# Patient Record
Sex: FEMALE | Race: WHITE | NOT HISPANIC OR LATINO | Employment: FULL TIME | URBAN - METROPOLITAN AREA
[De-identification: names, ages, dates, MRNs, and addresses within clinical notes are randomized per-mention and may not be internally consistent; named-entity substitution may affect disease eponyms.]

---

## 2023-11-18 ENCOUNTER — LAB (OUTPATIENT)
Dept: LAB | Facility: LAB | Age: 63
End: 2023-11-18
Payer: COMMERCIAL

## 2023-11-18 DIAGNOSIS — L40.3 PUSTULOSIS PALMARIS ET PLANTARIS: ICD-10-CM

## 2023-11-18 DIAGNOSIS — L29.8 OTHER PRURITUS: ICD-10-CM

## 2023-11-18 DIAGNOSIS — Z79.899 OTHER LONG TERM (CURRENT) DRUG THERAPY: Primary | ICD-10-CM

## 2023-11-18 DIAGNOSIS — T07.XXXA UNSPECIFIED MULTIPLE INJURIES, INITIAL ENCOUNTER: ICD-10-CM

## 2023-11-18 LAB
ALBUMIN SERPL BCP-MCNC: 4.1 G/DL (ref 3.4–5)
ALP SERPL-CCNC: 50 U/L (ref 33–136)
ALT SERPL W P-5'-P-CCNC: 26 U/L (ref 7–45)
ANION GAP SERPL CALC-SCNC: 13 MMOL/L (ref 10–20)
AST SERPL W P-5'-P-CCNC: 21 U/L (ref 9–39)
BASOPHILS # BLD AUTO: 0.04 X10*3/UL (ref 0–0.1)
BASOPHILS NFR BLD AUTO: 0.9 %
BILIRUB SERPL-MCNC: 0.4 MG/DL (ref 0–1.2)
BUN SERPL-MCNC: 21 MG/DL (ref 6–23)
CALCIUM SERPL-MCNC: 9 MG/DL (ref 8.6–10.3)
CHLORIDE SERPL-SCNC: 108 MMOL/L (ref 98–107)
CO2 SERPL-SCNC: 27 MMOL/L (ref 21–32)
CREAT SERPL-MCNC: 0.74 MG/DL (ref 0.5–1.05)
EOSINOPHIL # BLD AUTO: 0.26 X10*3/UL (ref 0–0.7)
EOSINOPHIL NFR BLD AUTO: 6 %
ERYTHROCYTE [DISTWIDTH] IN BLOOD BY AUTOMATED COUNT: 15.1 % (ref 11.5–14.5)
GFR SERPL CREATININE-BSD FRML MDRD: >90 ML/MIN/1.73M*2
GLUCOSE SERPL-MCNC: 86 MG/DL (ref 74–99)
HBV CORE AB SER QL: NONREACTIVE
HBV SURFACE AB SER-ACNC: <3.1 MIU/ML
HBV SURFACE AG SERPL QL IA: NONREACTIVE
HCT VFR BLD AUTO: 44 % (ref 36–46)
HCV AB SER QL: NONREACTIVE
HGB BLD-MCNC: 14.4 G/DL (ref 12–16)
HIV 1+2 AB+HIV1 P24 AG SERPL QL IA: NONREACTIVE
IMM GRANULOCYTES # BLD AUTO: 0.01 X10*3/UL (ref 0–0.7)
IMM GRANULOCYTES NFR BLD AUTO: 0.2 % (ref 0–0.9)
LYMPHOCYTES # BLD AUTO: 1.84 X10*3/UL (ref 1.2–4.8)
LYMPHOCYTES NFR BLD AUTO: 42.5 %
MCH RBC QN AUTO: 33.8 PG (ref 26–34)
MCHC RBC AUTO-ENTMCNC: 32.7 G/DL (ref 32–36)
MCV RBC AUTO: 103 FL (ref 80–100)
MONOCYTES # BLD AUTO: 0.37 X10*3/UL (ref 0.1–1)
MONOCYTES NFR BLD AUTO: 8.5 %
NEUTROPHILS # BLD AUTO: 1.81 X10*3/UL (ref 1.2–7.7)
NEUTROPHILS NFR BLD AUTO: 41.9 %
NRBC BLD-RTO: 0 /100 WBCS (ref 0–0)
PLATELET # BLD AUTO: 250 X10*3/UL (ref 150–450)
POTASSIUM SERPL-SCNC: 4.4 MMOL/L (ref 3.5–5.3)
PROT SERPL-MCNC: 6 G/DL (ref 6.4–8.2)
RBC # BLD AUTO: 4.26 X10*6/UL (ref 4–5.2)
SODIUM SERPL-SCNC: 144 MMOL/L (ref 136–145)
WBC # BLD AUTO: 4.3 X10*3/UL (ref 4.4–11.3)

## 2023-11-18 PROCEDURE — 87340 HEPATITIS B SURFACE AG IA: CPT

## 2023-11-18 PROCEDURE — 85025 COMPLETE CBC W/AUTO DIFF WBC: CPT

## 2023-11-18 PROCEDURE — 86803 HEPATITIS C AB TEST: CPT

## 2023-11-18 PROCEDURE — 80053 COMPREHEN METABOLIC PANEL: CPT

## 2023-11-18 PROCEDURE — 86708 HEPATITIS A ANTIBODY: CPT

## 2023-11-18 PROCEDURE — 36415 COLL VENOUS BLD VENIPUNCTURE: CPT

## 2023-11-18 PROCEDURE — 87389 HIV-1 AG W/HIV-1&-2 AB AG IA: CPT

## 2023-11-18 PROCEDURE — 86706 HEP B SURFACE ANTIBODY: CPT

## 2023-11-18 PROCEDURE — 86704 HEP B CORE ANTIBODY TOTAL: CPT

## 2023-11-19 LAB — HAV AB SER QL IA: NONREACTIVE

## 2023-11-30 ENCOUNTER — LAB (OUTPATIENT)
Dept: LAB | Facility: LAB | Age: 63
End: 2023-11-30
Payer: COMMERCIAL

## 2023-11-30 DIAGNOSIS — Z79.899 OTHER LONG TERM (CURRENT) DRUG THERAPY: Primary | ICD-10-CM

## 2023-11-30 PROCEDURE — 36415 COLL VENOUS BLD VENIPUNCTURE: CPT

## 2023-11-30 PROCEDURE — 86481 TB AG RESPONSE T-CELL SUSP: CPT

## 2023-12-02 LAB
NIL(NEG) CONTROL SPOT COUNT: NORMAL
PANEL A SPOT COUNT: 0
PANEL B SPOT COUNT: 0
POS CONTROL SPOT COUNT: NORMAL
T-SPOT. TB INTERPRETATION: NEGATIVE

## 2024-02-09 PROBLEM — L40.9 PSORIASIS: Status: ACTIVE | Noted: 2019-03-09

## 2024-02-09 PROBLEM — R07.81 RIB PAIN: Status: RESOLVED | Noted: 2018-05-10 | Resolved: 2024-02-09

## 2024-02-09 PROBLEM — Z86.010 HISTORY OF COLONIC POLYPS: Status: RESOLVED | Noted: 2024-02-09 | Resolved: 2024-02-09

## 2024-02-09 PROBLEM — M79.643 HAND PAIN: Status: RESOLVED | Noted: 2024-02-09 | Resolved: 2024-02-09

## 2024-02-09 PROBLEM — R05.9 COUGH: Status: RESOLVED | Noted: 2018-05-10 | Resolved: 2024-02-09

## 2024-02-09 PROBLEM — D12.5 BENIGN NEOPLASM OF SIGMOID COLON: Status: ACTIVE | Noted: 2024-02-09

## 2024-02-09 PROBLEM — F17.210 CIGARETTE SMOKER: Status: ACTIVE | Noted: 2023-02-21

## 2024-02-09 PROBLEM — S61.219A LACERATION OF FINGER: Status: RESOLVED | Noted: 2024-02-09 | Resolved: 2024-02-09

## 2024-02-09 PROBLEM — Z86.0100 HISTORY OF COLONIC POLYPS: Status: RESOLVED | Noted: 2024-02-09 | Resolved: 2024-02-09

## 2024-02-09 PROBLEM — E78.00 ELEVATED CHOLESTEROL: Status: RESOLVED | Noted: 2021-06-16 | Resolved: 2024-02-09

## 2024-02-09 RX ORDER — BACLOFEN 10 MG/1
10 TABLET ORAL EVERY 8 HOURS PRN
COMMUNITY
Start: 2023-01-17 | End: 2024-02-14 | Stop reason: WASHOUT

## 2024-02-09 RX ORDER — LEFLUNOMIDE 10 MG/1
10 TABLET ORAL DAILY
COMMUNITY
Start: 2023-10-11 | End: 2024-03-22 | Stop reason: SDUPTHER

## 2024-02-09 RX ORDER — BENZONATATE 100 MG/1
CAPSULE ORAL
COMMUNITY
Start: 2023-06-26 | End: 2024-02-14 | Stop reason: WASHOUT

## 2024-02-09 RX ORDER — FOLIC ACID 1 MG/1
1 TABLET ORAL DAILY
COMMUNITY
End: 2024-02-14 | Stop reason: WASHOUT

## 2024-02-09 RX ORDER — DICLOFENAC SODIUM 50 MG/1
50 TABLET, DELAYED RELEASE ORAL 2 TIMES DAILY
COMMUNITY
Start: 2023-11-16 | End: 2024-02-14 | Stop reason: WASHOUT

## 2024-02-09 RX ORDER — APREMILAST 30 MG/1
TABLET, FILM COATED ORAL EVERY 12 HOURS
COMMUNITY
End: 2024-02-14 | Stop reason: WASHOUT

## 2024-02-09 RX ORDER — CLOBETASOL PROPIONATE 0.5 MG/G
CREAM TOPICAL
COMMUNITY
Start: 2023-11-09 | End: 2024-02-14 | Stop reason: WASHOUT

## 2024-02-09 RX ORDER — HYDROCORTISONE 25 MG/G
OINTMENT TOPICAL AS NEEDED
COMMUNITY
Start: 2023-11-09

## 2024-02-09 RX ORDER — RISANKIZUMAB-RZAA 75 MG/0.83
KIT SUBCUTANEOUS
COMMUNITY
End: 2024-02-14 | Stop reason: WASHOUT

## 2024-02-09 RX ORDER — GUSELKUMAB 100 MG/ML
INJECTION SUBCUTANEOUS
COMMUNITY
Start: 2021-05-19 | End: 2024-02-14 | Stop reason: WASHOUT

## 2024-02-09 RX ORDER — IXEKIZUMAB 80 MG/ML
INJECTION, SOLUTION SUBCUTANEOUS
COMMUNITY
Start: 2023-10-23

## 2024-02-09 RX ORDER — ALBUTEROL SULFATE 90 UG/1
AEROSOL, METERED RESPIRATORY (INHALATION)
COMMUNITY
Start: 2023-06-26 | End: 2024-02-14 | Stop reason: WASHOUT

## 2024-02-09 RX ORDER — IBANDRONATE SODIUM 150 MG/1
TABLET, FILM COATED ORAL
COMMUNITY
Start: 2023-09-21 | End: 2024-02-14 | Stop reason: WASHOUT

## 2024-02-09 RX ORDER — ERGOCALCIFEROL 1.25 MG/1
50000 CAPSULE ORAL WEEKLY
COMMUNITY
Start: 2023-10-12 | End: 2024-02-14 | Stop reason: SDUPTHER

## 2024-02-09 RX ORDER — METHOTREXATE 2.5 MG/1
TABLET ORAL
COMMUNITY
End: 2024-02-14 | Stop reason: WASHOUT

## 2024-02-14 ENCOUNTER — OFFICE VISIT (OUTPATIENT)
Dept: PRIMARY CARE | Facility: CLINIC | Age: 64
End: 2024-02-14
Payer: COMMERCIAL

## 2024-02-14 VITALS
BODY MASS INDEX: 25.76 KG/M2 | SYSTOLIC BLOOD PRESSURE: 128 MMHG | DIASTOLIC BLOOD PRESSURE: 82 MMHG | HEIGHT: 62 IN | TEMPERATURE: 97.7 F | WEIGHT: 140 LBS | OXYGEN SATURATION: 97 % | HEART RATE: 94 BPM

## 2024-02-14 DIAGNOSIS — Z01.89 ENCOUNTER FOR ROUTINE LABORATORY TESTING: ICD-10-CM

## 2024-02-14 DIAGNOSIS — L40.9 PSORIASIS: ICD-10-CM

## 2024-02-14 DIAGNOSIS — M81.0 AGE-RELATED OSTEOPOROSIS WITHOUT CURRENT PATHOLOGICAL FRACTURE: ICD-10-CM

## 2024-02-14 DIAGNOSIS — M19.90 INFLAMMATORY ARTHRITIS: Primary | ICD-10-CM

## 2024-02-14 DIAGNOSIS — E55.9 VITAMIN D DEFICIENCY: ICD-10-CM

## 2024-02-14 PROCEDURE — 99203 OFFICE O/P NEW LOW 30 MIN: CPT | Performed by: INTERNAL MEDICINE

## 2024-02-14 PROCEDURE — 4004F PT TOBACCO SCREEN RCVD TLK: CPT | Performed by: INTERNAL MEDICINE

## 2024-02-14 RX ORDER — IBANDRONATE SODIUM 150 MG/1
150 TABLET, FILM COATED ORAL
COMMUNITY
End: 2024-02-14 | Stop reason: SDUPTHER

## 2024-02-14 RX ORDER — IBANDRONATE SODIUM 150 MG/1
150 TABLET, FILM COATED ORAL
Qty: 3 TABLET | Refills: 1 | Status: SHIPPED | OUTPATIENT
Start: 2024-02-14

## 2024-02-14 RX ORDER — ERGOCALCIFEROL 1.25 MG/1
50000 CAPSULE ORAL
Qty: 12 CAPSULE | Refills: 1 | Status: SHIPPED | OUTPATIENT
Start: 2024-02-14 | End: 2024-05-14 | Stop reason: ALTCHOICE

## 2024-02-14 ASSESSMENT — ENCOUNTER SYMPTOMS
DEPRESSION: 0
LOSS OF SENSATION IN FEET: 0
OCCASIONAL FEELINGS OF UNSTEADINESS: 0

## 2024-02-14 ASSESSMENT — PATIENT HEALTH QUESTIONNAIRE - PHQ9
SUM OF ALL RESPONSES TO PHQ9 QUESTIONS 1 AND 2: 0
1. LITTLE INTEREST OR PLEASURE IN DOING THINGS: NOT AT ALL
2. FEELING DOWN, DEPRESSED OR HOPELESS: NOT AT ALL

## 2024-02-14 ASSESSMENT — PAIN SCALES - GENERAL: PAINLEVEL: 4

## 2024-02-14 NOTE — PROGRESS NOTES
[facility]: [facility]  PROGRESS NOTE      Ammy Mar is a 63 y.o. female that is presenting today for Establish Care.    Assessment/Plan   Diagnoses and all orders for this visit:    Reviewed available records including Dr Oconnell records  Inflammatory arthritis    Fair control / Followed by Rheumatology  Psoriasis    On Arava / Followed by Rheumatology   Age-related osteoporosis without current pathological fracture     Under control with current treatment   Continue the same   Rx E-scripted 90 days x 1  -     ibandronate (Boniva) 150 mg tablet; Take 1 tablet (150 mg) by mouth every 30 (thirty) days. Take in morning with full glass of water on an empty stomach. No food, drink, meds, or lying down for 60 minutes after.  Vitamin D deficiency     Under control with current treatment   Continue the same   Rx E-scripted 90 days x 1  -     ergocalciferol (Vitamin D-2) 1.25 MG (09448 UT) capsule; Take 1 capsule (50,000 Units) by mouth 1 (one) time per week.  -     Vitamin D 25-Hydroxy,Total (for eval of Vitamin D levels); Future  Encounter for routine laboratory testing  -     Comprehensive Metabolic Panel; Future  -     CBC and Auto Differential; Future  -     Lipid Panel; Future  -     Hemoglobin A1C; Future  -     Vitamin D 25-Hydroxy,Total (for eval of Vitamin D levels); Future  -     TSH with reflex to Free T4 if abnormal; Future  Other orders  -     Follow Up In Primary Care; Future  Subjective   - Patient is here today to establish care been doing fairly well.    - Patient denies any other symptoms or concerns at this time.    - patient denies any adverse reactions to or concerns with his/her meds.      Review of Systems   All pertinent POSITIVES as noted per HPI.  All other systems have been reviewed and are NEGATIVE and /or Noncontributory to this patient current visit or complaint.    Objective   Vitals:    02/14/24 1617   BP: 128/82   Pulse: 94   Temp: 36.5 °C (97.7 °F)   SpO2: 97%  "     Body mass index is 25.61 kg/m².  Physical Exam  Vitals and nursing note reviewed.   Constitutional:       Appearance: Normal appearance.   HENT:      Head: Normocephalic and atraumatic.   Neck:      Vascular: No carotid bruit.   Cardiovascular:      Rate and Rhythm: Normal rate and regular rhythm.      Pulses: Normal pulses.      Heart sounds: Normal heart sounds.   Pulmonary:      Effort: Pulmonary effort is normal.      Breath sounds: Normal breath sounds.   Abdominal:      General: Abdomen is flat. Bowel sounds are normal.      Palpations: Abdomen is soft.   Musculoskeletal:         General: No swelling. Normal range of motion.      Cervical back: Neck supple.   Lymphadenopathy:      Cervical: No cervical adenopathy.   Skin:     General: Skin is warm and dry.   Neurological:      Mental Status: She is alert.   Psychiatric:         Mood and Affect: Mood normal.     Diagnostic Results   Lab Results   Component Value Date    GLUCOSE 86 11/18/2023    CALCIUM 9.0 11/18/2023     11/18/2023    K 4.4 11/18/2023    CO2 27 11/18/2023     (H) 11/18/2023    BUN 21 11/18/2023    CREATININE 0.74 11/18/2023     Lab Results   Component Value Date    ALT 26 11/18/2023    AST 21 11/18/2023    ALKPHOS 50 11/18/2023    BILITOT 0.4 11/18/2023     Lab Results   Component Value Date    WBC 4.3 (L) 11/18/2023    HGB 14.4 11/18/2023    HCT 44.0 11/18/2023     (H) 11/18/2023     11/18/2023     Lab Results   Component Value Date    CHOL 183 01/18/2023    CHOL 206 (H) 06/25/2022    CHOL 223 (H) 06/08/2021     Lab Results   Component Value Date    HDL 59 01/18/2023    HDL 64 06/25/2022    HDL 73 06/08/2021     Lab Results   Component Value Date    LDLCALC 112 01/18/2023    LDLCALC 127 06/25/2022    LDLCALC 138 (H) 06/08/2021     Lab Results   Component Value Date    TRIG 59 01/18/2023    TRIG 73 06/25/2022    TRIG 59 06/08/2021     No components found for: \"CHOLHDL\"  Lab Results   Component Value Date    HGBA1C " 5.5 01/21/2023     Other labs not included in the list above were reviewed either before or during this encounter.    History    Past Medical History:   Diagnosis Date    Cough 05/10/2018    Elevated cholesterol 06/16/2021    Hand pain 02/09/2024    Laceration of finger 02/09/2024    RA (rheumatoid arthritis) (CMS/[facility])      Past Surgical History:   Procedure Laterality Date    BACK SURGERY      CARPAL TUNNEL RELEASE Right     HYSTERECTOMY       Family History   Problem Relation Name Age of Onset    Diabetes Mother      Heart disease Mother      Hypertension Mother      Bone cancer Father      Other (multiple health issues) Sister      Prostate cancer Brother       Social History     Socioeconomic History    Marital status:      Spouse name: Not on file    Number of children: Not on file    Years of education: Not on file    Highest education level: Not on file   Occupational History    Not on file   Tobacco Use    Smoking status: Every Day     Packs/day: 0.25     Years: 33.00     Additional pack years: 0.00     Total pack years: 8.25     Types: Cigarettes    Smokeless tobacco: Never   Vaping Use    Vaping Use: Never used   Substance and Sexual Activity    Alcohol use: Yes     Comment: rarely    Drug use: Never    Sexual activity: Not on file   Other Topics Concern    Not on file   Social History Narrative    Not on file     Social Determinants of Health     Financial Resource Strain: Not on file   Food Insecurity: Not on file   Transportation Needs: Not on file   Physical Activity: Not on file   Stress: Not on file   Social Connections: Not on file   Intimate Partner Violence: Not on file   Housing Stability: Not on file     Allergies   Allergen Reactions    Ciprofloxacin Hives and Unknown    Erythromycin Nausea/vomiting and Unknown    Penicillins Hives and Unknown    Sulfa (Sulfonamide Antibiotics) GI Upset, Other and Nausea And Vomiting     Current Outpatient Medications on File Prior to Visit   Medication  Sig Dispense Refill    BIOTIN ORAL Take by mouth.      ergocalciferol (Vitamin D-2) 1.25 MG (25942 UT) capsule Take 1 capsule (50,000 Units) by mouth once a week.      hydrocortisone 2.5 % ointment if needed for rash.      ibandronate (Boniva) 150 mg tablet Take 1 tablet (150 mg) by mouth every 30 (thirty) days. Take in morning with full glass of water on an empty stomach. No food, drink, meds, or lying down for 60 minutes after.      leflunomide (Arava) 10 mg tablet Take 1 tablet (10 mg) by mouth once daily.      Taltz Autoinjector 80 mg/mL injection Inject under the skin every 28 (twenty-eight) days.      [DISCONTINUED] albuterol 90 mcg/actuation inhaler INHALE 1 TO 2 PUFFS EVERY 4 TO 6 HOURS AS NEEDED FOR WHEEZE FOR UP TO 30 DAYS      [DISCONTINUED] apremilast (Otezla) 30 mg tablet every 12 hours.      [DISCONTINUED] baclofen (Lioresal) 10 mg tablet Take 1 tablet (10 mg) by mouth every 8 hours if needed.      [DISCONTINUED] benzonatate (Tessalon) 100 mg capsule SWALLOW WHOLE TAKE 1 CAPSULE 3 TIMES A DAY AS NEEDED *DO NOT BREAK CHEW, DISSOLVE, CUT, OR CRUSH*      [DISCONTINUED] clobetasol (Temovate) 0.05 % cream       [DISCONTINUED] diclofenac (Voltaren) 50 mg EC tablet Take 1 tablet (50 mg) by mouth 2 times a day.      [DISCONTINUED] flurandrenolide 4 mcg/cm2 tape Apply topically.      [DISCONTINUED] folic acid (Folvite) 1 mg tablet Take 1 tablet (1 mg) by mouth once daily.      [DISCONTINUED] ibandronate (Boniva) 150 mg tablet PLEASE SEE ATTACHED FOR DETAILED DIRECTIONS      [DISCONTINUED] methotrexate (Trexall) 2.5 mg tablet TAKE 10 TABLETS BY MOUTH EVERY MONDAY      [DISCONTINUED] risankizumab 150 Dose (Skyrizi) 150mg/1.66mL(75 mg/0.83 mL x2) prefilled syringe kit 1.66 ml Subcutaneous for 30 day(s)      [DISCONTINUED] Tremfya 100 mg/mL injection        No current facility-administered medications on file prior to visit.     Immunization History   Administered Date(s) Administered    Moderna SARS-CoV-2  Vaccination 03/24/2021, 04/23/2021    Pfizer COVID-19 vaccine, bivalent, age 12 years and older (30 mcg/0.3 mL) 09/27/2022    Pfizer Gray Cap SARS-CoV-2 05/06/2022    Pfizer Purple Cap SARS-CoV-2 11/19/2021    Tdap vaccine, age 7 year and older (BOOSTRIX, ADACEL) 01/12/2020, 03/23/2020     Patient's medical history was reviewed and updated either before or during this encounter.       Omar Guerin MD

## 2024-02-15 PROBLEM — M19.90 INFLAMMATORY ARTHRITIS: Status: ACTIVE | Noted: 2024-02-15

## 2024-02-15 PROBLEM — M05.79 RHEUMATOID ARTHRITIS INVOLVING MULTIPLE SITES WITH POSITIVE RHEUMATOID FACTOR (MULTI): Status: RESOLVED | Noted: 2024-02-15 | Resolved: 2024-02-15

## 2024-02-15 PROBLEM — M81.0 AGE-RELATED OSTEOPOROSIS WITHOUT CURRENT PATHOLOGICAL FRACTURE: Status: ACTIVE | Noted: 2024-02-15

## 2024-02-15 PROBLEM — M05.79 RHEUMATOID ARTHRITIS INVOLVING MULTIPLE SITES WITH POSITIVE RHEUMATOID FACTOR (MULTI): Status: ACTIVE | Noted: 2024-02-15

## 2024-02-15 PROBLEM — E55.9 VITAMIN D DEFICIENCY: Status: ACTIVE | Noted: 2024-02-15

## 2024-03-22 ENCOUNTER — OFFICE VISIT (OUTPATIENT)
Dept: RHEUMATOLOGY | Facility: CLINIC | Age: 64
End: 2024-03-22
Payer: COMMERCIAL

## 2024-03-22 ENCOUNTER — LAB (OUTPATIENT)
Dept: LAB | Facility: LAB | Age: 64
End: 2024-03-22
Payer: COMMERCIAL

## 2024-03-22 VITALS — BODY MASS INDEX: 24.87 KG/M2 | WEIGHT: 136 LBS

## 2024-03-22 DIAGNOSIS — M06.9 RHEUMATOID ARTHRITIS, INVOLVING UNSPECIFIED SITE, UNSPECIFIED WHETHER RHEUMATOID FACTOR PRESENT (MULTI): Primary | ICD-10-CM

## 2024-03-22 DIAGNOSIS — L40.9 PSORIASIS: ICD-10-CM

## 2024-03-22 DIAGNOSIS — M06.9 RHEUMATOID ARTHRITIS, INVOLVING UNSPECIFIED SITE, UNSPECIFIED WHETHER RHEUMATOID FACTOR PRESENT (MULTI): ICD-10-CM

## 2024-03-22 PROCEDURE — 80076 HEPATIC FUNCTION PANEL: CPT

## 2024-03-22 PROCEDURE — 99204 OFFICE O/P NEW MOD 45 MIN: CPT | Performed by: INTERNAL MEDICINE

## 2024-03-22 PROCEDURE — 86140 C-REACTIVE PROTEIN: CPT

## 2024-03-22 PROCEDURE — 82565 ASSAY OF CREATININE: CPT

## 2024-03-22 PROCEDURE — 36415 COLL VENOUS BLD VENIPUNCTURE: CPT

## 2024-03-22 PROCEDURE — 99214 OFFICE O/P EST MOD 30 MIN: CPT | Performed by: INTERNAL MEDICINE

## 2024-03-22 PROCEDURE — 85025 COMPLETE CBC W/AUTO DIFF WBC: CPT

## 2024-03-22 PROCEDURE — 4004F PT TOBACCO SCREEN RCVD TLK: CPT | Performed by: INTERNAL MEDICINE

## 2024-03-22 RX ORDER — NABUMETONE 750 MG/1
750 TABLET, FILM COATED ORAL 2 TIMES DAILY PRN
Qty: 60 TABLET | Refills: 3 | Status: SHIPPED | OUTPATIENT
Start: 2024-03-22 | End: 2024-11-17

## 2024-03-22 RX ORDER — LEFLUNOMIDE 20 MG/1
20 TABLET ORAL DAILY
Qty: 30 TABLET | Refills: 3 | Status: SHIPPED | OUTPATIENT
Start: 2024-03-22

## 2024-03-22 NOTE — PROGRESS NOTES
"Ref per Dr. Oconnor for evaluation and treatment of  PS  /  PSA  /  ? RA   Previous [facility] patient.  Treatment with Leflunomide and Taltz ( Rx with Dr. Mejia )    Previous Otezla, tremphya, skyrizi, methotrexate with little relief.  Per patient, increased pain since coming off of diclofinac bid and methotrexate # 9 tabs weekly due to hair loss.      HPI - Former [facility] pt - she has a h/o Ps with \"thousands of blisters on my foot - it was always my R foot and my R hand.  She was on otezla, stopped working, then skyrizi which helped for awhile, then stopped, then tremfya.  She is now on taltz, and this works the best.   She has arthritis in her back - she has had 3 back surgeries.  She woke up 1 morning (?1 yr ago) and couldn't \"get off the couch myself - every bone in my body hurt so bad.  I would have to scream out, and my  would have to get me off the couch\"  This started \"right before the 1st set of notes\"   Her primary put her on steroids, which helped somewhat.  She saw rheum who put her on methotrexate 10 tab, leflunomide, diclofenac, and folic acid, and this made her hair thin.  She was decr to 9 mg, and then it was stopped 2/5/24.  She is still on lelfunomide.  She was also taken off diclofenac.  Hair loss has stopped.   \"I, un, you know, uh, sometimes I ache\"  - she gets hand and shoulder pain and sometimes her knees.  When it 1st started, she couldn't even lift her arms d/t pain.  AM stiffness until a hot shower.  She said that \"they said the R ankle seems a bit swollen\"  \"my upper arms, my joints - especially with the weather changes\"  Things did feel better when she was on methotrexate and diclofenac.   She is on ibandronate from rheum initially, now primary filling it.  She has no h/o fx.  She was on pred \"awhile, months\"   No parental hip fx.   No fever, HA (did have with otezla), mouth sores, or tongue/jaw irene.  ?dry eyes \"I always had I don't tear enough - I can't wear contacts\"  Sometimes " "has \"a spot\" or a flashing light - not often - saw a specialist who didn't see anything.  No raynauds, CP, or resp.  \"I have constant gas\" and constipation.   No numbness/tingling    FH - doesn't know much FH  SH - +smoker.   No EtOH.  No drugs    PE  Gen - NAD  Neck - supple, no LAD  CV - RRR no r/m/g  Lungs - CTA  Abd - +BS  Extr - 2+ DP, PT, and rad pulses.  No edema  Skin - no rash.  Toenails polished  Psych - nl affect  Neuro - nl strength.  Difficulty eliciting BLE DTRs  Msk - no synovitis, enthesitis, or dactylitis.  R wrist tender and pain with ROM.  Pain with ROM B shoulders.    A/P - Pt with palmoplantar pustular psoriasis, chronic back pain s/p 3 back surgeries who presented to primary with sudden onset of pain all over that responded to prednisone.  She saw rheum who started her on leflunomide, methotrexate, and diclofenac and tapered her off prednisone.  She developed hair loss, and methotrexate and diclofenac were stopped few mo ago.  Hair loss has stopped although hair remains thin.  Exam remarkable for pain with ROM R wrist and B shoulders but no obvious inflammation  RF neg, CCP Ab \"mod\"-\"high\" +  ALLYN+x1 with +anshul-1Ab and nl CK, ALLYN neg x several since  ?diagnosis - poss RA with Ps, PsA, etc, although sx init sounded possibly like PMR.  Taltz keeping Ps under control  OP - primary treating  Smoking cessation urged  Nabumetone - expl risks/benefits/no OTC NSAIDs  Increase lef to 20mg daily  Check labs  Reeval 1 mo  "

## 2024-03-23 LAB
ALBUMIN SERPL BCP-MCNC: 4.6 G/DL (ref 3.4–5)
ALP SERPL-CCNC: 56 U/L (ref 33–136)
ALT SERPL W P-5'-P-CCNC: 14 U/L (ref 7–45)
AST SERPL W P-5'-P-CCNC: 19 U/L (ref 9–39)
BASOPHILS # BLD AUTO: 0.07 X10*3/UL (ref 0–0.1)
BASOPHILS NFR BLD AUTO: 1.2 %
BILIRUB DIRECT SERPL-MCNC: 0.1 MG/DL (ref 0–0.3)
BILIRUB SERPL-MCNC: 0.5 MG/DL (ref 0–1.2)
CREAT SERPL-MCNC: 0.59 MG/DL (ref 0.5–1.05)
CRP SERPL-MCNC: 0.14 MG/DL
EGFRCR SERPLBLD CKD-EPI 2021: >90 ML/MIN/1.73M*2
EOSINOPHIL # BLD AUTO: 0.27 X10*3/UL (ref 0–0.7)
EOSINOPHIL NFR BLD AUTO: 4.6 %
ERYTHROCYTE [DISTWIDTH] IN BLOOD BY AUTOMATED COUNT: 13.2 % (ref 11.5–14.5)
HCT VFR BLD AUTO: 47 % (ref 36–46)
HGB BLD-MCNC: 15.5 G/DL (ref 12–16)
IMM GRANULOCYTES # BLD AUTO: 0.01 X10*3/UL (ref 0–0.7)
IMM GRANULOCYTES NFR BLD AUTO: 0.2 % (ref 0–0.9)
LYMPHOCYTES # BLD AUTO: 2.92 X10*3/UL (ref 1.2–4.8)
LYMPHOCYTES NFR BLD AUTO: 50 %
MCH RBC QN AUTO: 32.8 PG (ref 26–34)
MCHC RBC AUTO-ENTMCNC: 33 G/DL (ref 32–36)
MCV RBC AUTO: 99 FL (ref 80–100)
MONOCYTES # BLD AUTO: 0.46 X10*3/UL (ref 0.1–1)
MONOCYTES NFR BLD AUTO: 7.9 %
NEUTROPHILS # BLD AUTO: 2.11 X10*3/UL (ref 1.2–7.7)
NEUTROPHILS NFR BLD AUTO: 36.1 %
NRBC BLD-RTO: 0 /100 WBCS (ref 0–0)
PLATELET # BLD AUTO: 240 X10*3/UL (ref 150–450)
PROT SERPL-MCNC: 7 G/DL (ref 6.4–8.2)
RBC # BLD AUTO: 4.73 X10*6/UL (ref 4–5.2)
WBC # BLD AUTO: 5.8 X10*3/UL (ref 4.4–11.3)

## 2024-04-22 ENCOUNTER — OFFICE VISIT (OUTPATIENT)
Dept: RHEUMATOLOGY | Facility: CLINIC | Age: 64
End: 2024-04-22
Payer: COMMERCIAL

## 2024-04-22 ENCOUNTER — LAB (OUTPATIENT)
Dept: LAB | Facility: LAB | Age: 64
End: 2024-04-22
Payer: COMMERCIAL

## 2024-04-22 VITALS — WEIGHT: 128.9 LBS | DIASTOLIC BLOOD PRESSURE: 78 MMHG | BODY MASS INDEX: 23.58 KG/M2 | SYSTOLIC BLOOD PRESSURE: 128 MMHG

## 2024-04-22 DIAGNOSIS — L40.9 PSORIASIS: Primary | ICD-10-CM

## 2024-04-22 DIAGNOSIS — L40.9 PSORIASIS: ICD-10-CM

## 2024-04-22 DIAGNOSIS — M19.90 INFLAMMATORY ARTHRITIS: ICD-10-CM

## 2024-04-22 PROCEDURE — 99214 OFFICE O/P EST MOD 30 MIN: CPT | Performed by: INTERNAL MEDICINE

## 2024-04-22 PROCEDURE — 80076 HEPATIC FUNCTION PANEL: CPT

## 2024-04-22 PROCEDURE — 86140 C-REACTIVE PROTEIN: CPT

## 2024-04-22 PROCEDURE — 82565 ASSAY OF CREATININE: CPT

## 2024-04-22 PROCEDURE — 36415 COLL VENOUS BLD VENIPUNCTURE: CPT

## 2024-04-22 PROCEDURE — 85025 COMPLETE CBC W/AUTO DIFF WBC: CPT

## 2024-04-22 NOTE — PROGRESS NOTES
"HPI \"I feel a bit better\" but does have \"moments\" especially in AM.  She takes nabumetone PRN but hasn't needed it much.  Her hands, arms, and knees hurt but not as much.  No swelling.   AM stiffness ?duration \"I get up an hour early, and then I get a hot shower, which usually helps\"  Ps is good.  She had 2 blisters on hand and 1 on foot, now resolved.  No CP, resp other than sneezing with allergies, or GI    PE  NAD - smells of cigarettes  RRR no r/m/g  CTA  No edema  No synovitis    A/P - Pt with palmoplantar Ps, chronic back pain s/p surg x3, and PsA - feeling better with taltz and incr leflunomide  OP - primary treats  Reviewed prev labs  Check labs  Reeval 8 wks  "

## 2024-04-23 LAB
ALBUMIN SERPL BCP-MCNC: 4.7 G/DL (ref 3.4–5)
ALP SERPL-CCNC: 46 U/L (ref 33–136)
ALT SERPL W P-5'-P-CCNC: 13 U/L (ref 7–45)
AST SERPL W P-5'-P-CCNC: 15 U/L (ref 9–39)
BASOPHILS # BLD AUTO: 0.06 X10*3/UL (ref 0–0.1)
BASOPHILS NFR BLD AUTO: 0.9 %
BILIRUB DIRECT SERPL-MCNC: 0.1 MG/DL (ref 0–0.3)
BILIRUB SERPL-MCNC: 0.5 MG/DL (ref 0–1.2)
CREAT SERPL-MCNC: 0.89 MG/DL (ref 0.5–1.05)
CRP SERPL-MCNC: 0.13 MG/DL
EGFRCR SERPLBLD CKD-EPI 2021: 73 ML/MIN/1.73M*2
EOSINOPHIL # BLD AUTO: 0.37 X10*3/UL (ref 0–0.7)
EOSINOPHIL NFR BLD AUTO: 5.3 %
ERYTHROCYTE [DISTWIDTH] IN BLOOD BY AUTOMATED COUNT: 13.3 % (ref 11.5–14.5)
HCT VFR BLD AUTO: 47.7 % (ref 36–46)
HGB BLD-MCNC: 16.2 G/DL (ref 12–16)
IMM GRANULOCYTES # BLD AUTO: 0.01 X10*3/UL (ref 0–0.7)
IMM GRANULOCYTES NFR BLD AUTO: 0.1 % (ref 0–0.9)
LYMPHOCYTES # BLD AUTO: 3.7 X10*3/UL (ref 1.2–4.8)
LYMPHOCYTES NFR BLD AUTO: 53 %
MCH RBC QN AUTO: 32.5 PG (ref 26–34)
MCHC RBC AUTO-ENTMCNC: 34 G/DL (ref 32–36)
MCV RBC AUTO: 96 FL (ref 80–100)
MONOCYTES # BLD AUTO: 0.63 X10*3/UL (ref 0.1–1)
MONOCYTES NFR BLD AUTO: 9 %
NEUTROPHILS # BLD AUTO: 2.21 X10*3/UL (ref 1.2–7.7)
NEUTROPHILS NFR BLD AUTO: 31.7 %
NRBC BLD-RTO: 0 /100 WBCS (ref 0–0)
PLATELET # BLD AUTO: 246 X10*3/UL (ref 150–450)
PROT SERPL-MCNC: 6.8 G/DL (ref 6.4–8.2)
RBC # BLD AUTO: 4.99 X10*6/UL (ref 4–5.2)
WBC # BLD AUTO: 7 X10*3/UL (ref 4.4–11.3)

## 2024-04-27 ENCOUNTER — HOSPITAL ENCOUNTER (OUTPATIENT)
Dept: RADIOLOGY | Facility: HOSPITAL | Age: 64
Discharge: HOME | End: 2024-04-27
Payer: COMMERCIAL

## 2024-04-27 VITALS — WEIGHT: 125 LBS | BODY MASS INDEX: 23 KG/M2 | HEIGHT: 62 IN

## 2024-04-27 DIAGNOSIS — Z12.31 ENCOUNTER FOR SCREENING MAMMOGRAM FOR MALIGNANT NEOPLASM OF BREAST: ICD-10-CM

## 2024-04-27 PROCEDURE — 77063 BREAST TOMOSYNTHESIS BI: CPT | Performed by: RADIOLOGY

## 2024-04-27 PROCEDURE — 77067 SCR MAMMO BI INCL CAD: CPT | Performed by: RADIOLOGY

## 2024-04-27 PROCEDURE — 77067 SCR MAMMO BI INCL CAD: CPT

## 2024-04-30 PROBLEM — M81.0 SENILE OSTEOPOROSIS: Status: ACTIVE | Noted: 2023-02-02

## 2024-04-30 RX ORDER — DOXYCYCLINE HYCLATE 20 MG
20 TABLET ORAL 2 TIMES DAILY
COMMUNITY
Start: 2024-04-17 | End: 2024-07-01

## 2024-05-08 ENCOUNTER — LAB (OUTPATIENT)
Dept: LAB | Facility: LAB | Age: 64
End: 2024-05-08
Payer: COMMERCIAL

## 2024-05-08 DIAGNOSIS — Z01.89 ENCOUNTER FOR ROUTINE LABORATORY TESTING: ICD-10-CM

## 2024-05-08 DIAGNOSIS — E55.9 VITAMIN D DEFICIENCY: ICD-10-CM

## 2024-05-08 LAB
25(OH)D3 SERPL-MCNC: 190 NG/ML (ref 30–100)
ALBUMIN SERPL BCP-MCNC: 4.4 G/DL (ref 3.4–5)
ALP SERPL-CCNC: 42 U/L (ref 33–136)
ALT SERPL W P-5'-P-CCNC: 11 U/L (ref 7–45)
ANION GAP SERPL CALC-SCNC: 13 MMOL/L (ref 10–20)
AST SERPL W P-5'-P-CCNC: 14 U/L (ref 9–39)
BASOPHILS # BLD AUTO: 0.05 X10*3/UL (ref 0–0.1)
BASOPHILS NFR BLD AUTO: 1.1 %
BILIRUB SERPL-MCNC: 0.4 MG/DL (ref 0–1.2)
BUN SERPL-MCNC: 22 MG/DL (ref 6–23)
CALCIUM SERPL-MCNC: 9.4 MG/DL (ref 8.6–10.3)
CHLORIDE SERPL-SCNC: 108 MMOL/L (ref 98–107)
CHOLEST SERPL-MCNC: 220 MG/DL (ref 0–199)
CHOLESTEROL/HDL RATIO: 4.1
CO2 SERPL-SCNC: 25 MMOL/L (ref 21–32)
CREAT SERPL-MCNC: 0.7 MG/DL (ref 0.5–1.05)
EGFRCR SERPLBLD CKD-EPI 2021: >90 ML/MIN/1.73M*2
EOSINOPHIL # BLD AUTO: 0.33 X10*3/UL (ref 0–0.7)
EOSINOPHIL NFR BLD AUTO: 7.4 %
ERYTHROCYTE [DISTWIDTH] IN BLOOD BY AUTOMATED COUNT: 13.8 % (ref 11.5–14.5)
EST. AVERAGE GLUCOSE BLD GHB EST-MCNC: 100 MG/DL
GLUCOSE SERPL-MCNC: 101 MG/DL (ref 74–99)
HBA1C MFR BLD: 5.1 %
HCT VFR BLD AUTO: 45.9 % (ref 36–46)
HDLC SERPL-MCNC: 54.3 MG/DL
HGB BLD-MCNC: 15 G/DL (ref 12–16)
IMM GRANULOCYTES # BLD AUTO: 0 X10*3/UL (ref 0–0.7)
IMM GRANULOCYTES NFR BLD AUTO: 0 % (ref 0–0.9)
LDLC SERPL CALC-MCNC: 142 MG/DL
LYMPHOCYTES # BLD AUTO: 2.3 X10*3/UL (ref 1.2–4.8)
LYMPHOCYTES NFR BLD AUTO: 51.9 %
MCH RBC QN AUTO: 32.3 PG (ref 26–34)
MCHC RBC AUTO-ENTMCNC: 32.7 G/DL (ref 32–36)
MCV RBC AUTO: 99 FL (ref 80–100)
MONOCYTES # BLD AUTO: 0.47 X10*3/UL (ref 0.1–1)
MONOCYTES NFR BLD AUTO: 10.6 %
NEUTROPHILS # BLD AUTO: 1.28 X10*3/UL (ref 1.2–7.7)
NEUTROPHILS NFR BLD AUTO: 29 %
NON HDL CHOLESTEROL: 166 MG/DL (ref 0–149)
NRBC BLD-RTO: 0 /100 WBCS (ref 0–0)
PLATELET # BLD AUTO: 233 X10*3/UL (ref 150–450)
POTASSIUM SERPL-SCNC: 4 MMOL/L (ref 3.5–5.3)
PROT SERPL-MCNC: 6.5 G/DL (ref 6.4–8.2)
RBC # BLD AUTO: 4.64 X10*6/UL (ref 4–5.2)
SODIUM SERPL-SCNC: 142 MMOL/L (ref 136–145)
T4 FREE SERPL-MCNC: 1.01 NG/DL (ref 0.61–1.12)
TRIGL SERPL-MCNC: 117 MG/DL (ref 0–149)
TSH SERPL-ACNC: 4.72 MIU/L (ref 0.44–3.98)
VLDL: 23 MG/DL (ref 0–40)
WBC # BLD AUTO: 4.4 X10*3/UL (ref 4.4–11.3)

## 2024-05-08 PROCEDURE — 36415 COLL VENOUS BLD VENIPUNCTURE: CPT

## 2024-05-08 PROCEDURE — 82306 VITAMIN D 25 HYDROXY: CPT

## 2024-05-08 PROCEDURE — 83036 HEMOGLOBIN GLYCOSYLATED A1C: CPT

## 2024-05-14 ENCOUNTER — OFFICE VISIT (OUTPATIENT)
Dept: PRIMARY CARE | Facility: CLINIC | Age: 64
End: 2024-05-14
Payer: COMMERCIAL

## 2024-05-14 VITALS
WEIGHT: 127 LBS | DIASTOLIC BLOOD PRESSURE: 70 MMHG | HEIGHT: 62 IN | BODY MASS INDEX: 23.37 KG/M2 | SYSTOLIC BLOOD PRESSURE: 120 MMHG | OXYGEN SATURATION: 95 % | TEMPERATURE: 97.8 F | HEART RATE: 94 BPM

## 2024-05-14 DIAGNOSIS — E78.00 PURE HYPERCHOLESTEROLEMIA: ICD-10-CM

## 2024-05-14 DIAGNOSIS — E67.3 VITAMIN D INTOXICATION: ICD-10-CM

## 2024-05-14 DIAGNOSIS — Z00.00 ENCOUNTER FOR WELLNESS EXAMINATION: Primary | ICD-10-CM

## 2024-05-14 DIAGNOSIS — E03.8 SUBCLINICAL HYPOTHYROIDISM: ICD-10-CM

## 2024-05-14 PROCEDURE — 99396 PREV VISIT EST AGE 40-64: CPT | Performed by: INTERNAL MEDICINE

## 2024-05-14 ASSESSMENT — ENCOUNTER SYMPTOMS
DEPRESSION: 0
LOSS OF SENSATION IN FEET: 0
OCCASIONAL FEELINGS OF UNSTEADINESS: 0

## 2024-05-14 ASSESSMENT — PAIN SCALES - GENERAL: PAINLEVEL: 0-NO PAIN

## 2024-05-14 NOTE — PROGRESS NOTES
[facility]: [facility]  MEDICARE WELLNESS EXAM      Ammy Mar is a 64 y.o. female that is presenting today for Annual Exam.    Assessment/Plan    Diagnoses and all orders for this visit:  Encounter for wellness examination      Stable overall, Discussed BW and /or DI results and answered all questions Updated HM - Vacc   Pure hypercholesterolemia      Discussed low fat diet and exercise       Provided with low fat diet and Hyperiodemia patient educational material      Will re check in 4-6 months - if persist to be elevated -- start meds  Vitamin D intoxication     Vit D was held till further notice   Subclinical hypothyroidism  -     TSH with reflex to Free T4 if abnormal; Future  Other orders  -     Follow Up In Primary Care  -     Follow Up In Primary Care; Future  Subjective   - Patient is here today for her wellness. Been feeling well.    - Patient denies any symptoms or concerns at this time.    - patient denies any adverse reactions to or concerns with his/her meds.      Review of Systems  All pertinent POSITIVES as noted per HPI.  All other systems have been reviewed and are NEGATIVE and /or Noncontributory to this patient current visit or complaint.    Objective   Vitals:    05/14/24 1548   BP: 120/70   Pulse: 94   Temp: 36.6 °C (97.8 °F)   SpO2: 95%      Body mass index is 23.23 kg/m².  Physical Exam  Vitals and nursing note reviewed.   Constitutional:       Appearance: Normal appearance.   HENT:      Head: Normocephalic and atraumatic.      Right Ear: Tympanic membrane, ear canal and external ear normal.      Left Ear: Tympanic membrane, ear canal and external ear normal.      Nose: Nose normal.      Mouth/Throat:      Mouth: Mucous membranes are moist.      Pharynx: Oropharynx is clear.   Eyes:      Extraocular Movements: Extraocular movements intact.      Conjunctiva/sclera: Conjunctivae normal.      Pupils: Pupils are equal, round, and reactive to light.   Neck:       Vascular: No carotid bruit.   Cardiovascular:      Rate and Rhythm: Normal rate and regular rhythm.      Pulses: Normal pulses.      Heart sounds: Normal heart sounds.   Pulmonary:      Effort: Pulmonary effort is normal.      Breath sounds: Normal breath sounds.   Abdominal:      General: Abdomen is flat. Bowel sounds are normal.      Palpations: Abdomen is soft.   Musculoskeletal:         General: No swelling. Normal range of motion.      Cervical back: Normal range of motion and neck supple.   Lymphadenopathy:      Cervical: No cervical adenopathy.   Skin:     General: Skin is warm and dry.   Neurological:      General: No focal deficit present.      Mental Status: She is alert and oriented to person, place, and time. Mental status is at baseline.      Cranial Nerves: No cranial nerve deficit.      Deep Tendon Reflexes:      Reflex Scores:       Patellar reflexes are 1+ on the right side and 1+ on the left side.     Comments: Diminished since the patient back surgeryu   Psychiatric:         Mood and Affect: Mood normal.         Behavior: Behavior normal.     Diagnostic Results   Lab Results   Component Value Date    GLUCOSE 101 (H) 05/08/2024    CALCIUM 9.4 05/08/2024     05/08/2024    K 4.0 05/08/2024    CO2 25 05/08/2024     (H) 05/08/2024    BUN 22 05/08/2024    CREATININE 0.70 05/08/2024     Lab Results   Component Value Date    ALT 11 05/08/2024    AST 14 05/08/2024    ALKPHOS 42 05/08/2024    BILITOT 0.4 05/08/2024     Lab Results   Component Value Date    WBC 4.4 05/08/2024    HGB 15.0 05/08/2024    HCT 45.9 05/08/2024    MCV 99 05/08/2024     05/08/2024     Lab Results   Component Value Date    CHOL 220 (H) 05/08/2024    CHOL 183 01/18/2023    CHOL 206 (H) 06/25/2022     Lab Results   Component Value Date    HDL 54.3 05/08/2024    HDL 59 01/18/2023    HDL 64 06/25/2022     Lab Results   Component Value Date    LDLCALC 142 (H) 05/08/2024    LDLCALC 112 01/18/2023    LDLCALC 127  "06/25/2022     Lab Results   Component Value Date    TRIG 117 05/08/2024    TRIG 59 01/18/2023    TRIG 73 06/25/2022     No components found for: \"CHOLHDL\"  Lab Results   Component Value Date    HGBA1C 5.1 05/08/2024     Other labs not included in the list above reviewed either before or during this encounter.    History   Past Medical History:   Diagnosis Date    Cough 05/10/2018    Elevated cholesterol 06/16/2021    Hand pain 02/09/2024    Laceration of finger 02/09/2024    RA (rheumatoid arthritis) ([facility])      Past Surgical History:   Procedure Laterality Date    BACK SURGERY      CARPAL TUNNEL RELEASE Right     HYSTERECTOMY       Family History   Problem Relation Name Age of Onset    Diabetes Mother      Heart disease Mother      Hypertension Mother      Bone cancer Father      Other (multiple health issues) Sister      Prostate cancer Brother       Social History     Socioeconomic History    Marital status:      Spouse name: Not on file    Number of children: Not on file    Years of education: Not on file    Highest education level: Not on file   Occupational History    Not on file   Tobacco Use    Smoking status: Every Day     Current packs/day: 0.25     Average packs/day: 0.3 packs/day for 33.0 years (8.3 ttl pk-yrs)     Types: Cigarettes    Smokeless tobacco: Never   Vaping Use    Vaping status: Never Used   Substance and Sexual Activity    Alcohol use: Yes     Comment: rarely    Drug use: Never    Sexual activity: Not on file   Other Topics Concern    Not on file   Social History Narrative    Not on file     Social Determinants of Health     Financial Resource Strain: Not on file   Food Insecurity: Not on file   Transportation Needs: Not on file   Physical Activity: Not on file   Stress: Not on file   Social Connections: Not on file   Intimate Partner Violence: Not on file   Housing Stability: Not on file     Allergies   Allergen Reactions    Ciprofloxacin Hives and Unknown    Erythromycin " Nausea/vomiting and Unknown    Penicillins Hives and Unknown    Sulfa (Sulfonamide Antibiotics) GI Upset, Other and Nausea And Vomiting     Current Outpatient Medications on File Prior to Visit   Medication Sig Dispense Refill    BIOTIN ORAL Take by mouth.      doxycycline (Periostat) 20 mg tablet Take 1 tablet (20 mg) by mouth 2 times a day.      hydrocortisone 2.5 % ointment if needed for rash.      ibandronate (Boniva) 150 mg tablet Take 1 tablet (150 mg) by mouth every 30 (thirty) days. Take in morning with full glass of water on an empty stomach. No food, drink, meds, or lying down for 60 minutes after. 3 tablet 1    leflunomide (Arava) 20 mg tablet Take 1 tablet (20 mg) by mouth once daily. 30 tablet 3    nabumetone (Relafen) 750 mg tablet Take 1 tablet (750 mg) by mouth 2 times a day as needed for mild pain (1 - 3) or moderate pain (4 - 6). 60 tablet 3    Taltz Autoinjector 80 mg/mL injection Inject under the skin every 28 (twenty-eight) days.      ergocalciferol (Vitamin D-2) 1.25 MG (49974 UT) capsule Take 1 capsule (50,000 Units) by mouth 1 (one) time per week. 12 capsule 1     No current facility-administered medications on file prior to visit.     Immunization History   Administered Date(s) Administered    Moderna SARS-CoV-2 Vaccination 03/24/2021, 04/23/2021    Pfizer COVID-19 vaccine, bivalent, age 12 years and older (30 mcg/0.3 mL) 09/27/2022    Pfizer Gray Cap SARS-CoV-2 05/06/2022    Pfizer Purple Cap SARS-CoV-2 11/19/2021    Tdap vaccine, age 7 year and older (BOOSTRIX, ADACEL) 01/12/2020, 03/23/2020     Patient's medical history was reviewed and updated either before or during this encounter.     Omar Guerin MD

## 2024-05-15 ENCOUNTER — HOSPITAL ENCOUNTER (OUTPATIENT)
Dept: RADIOLOGY | Facility: HOSPITAL | Age: 64
Discharge: HOME | End: 2024-05-15
Payer: COMMERCIAL

## 2024-05-15 ENCOUNTER — APPOINTMENT (OUTPATIENT)
Dept: PRIMARY CARE | Facility: CLINIC | Age: 64
End: 2024-05-15
Payer: COMMERCIAL

## 2024-05-15 DIAGNOSIS — R92.8 OTHER ABNORMAL AND INCONCLUSIVE FINDINGS ON DIAGNOSTIC IMAGING OF BREAST: ICD-10-CM

## 2024-05-15 PROCEDURE — 77065 DX MAMMO INCL CAD UNI: CPT | Mod: LEFT SIDE | Performed by: STUDENT IN AN ORGANIZED HEALTH CARE EDUCATION/TRAINING PROGRAM

## 2024-05-15 PROCEDURE — 77061 BREAST TOMOSYNTHESIS UNI: CPT | Mod: LEFT SIDE | Performed by: STUDENT IN AN ORGANIZED HEALTH CARE EDUCATION/TRAINING PROGRAM

## 2024-05-15 PROCEDURE — 77061 BREAST TOMOSYNTHESIS UNI: CPT | Mod: LT

## 2024-05-15 PROCEDURE — 76642 ULTRASOUND BREAST LIMITED: CPT | Mod: LT

## 2024-05-15 PROCEDURE — 76642 ULTRASOUND BREAST LIMITED: CPT | Mod: LEFT SIDE | Performed by: STUDENT IN AN ORGANIZED HEALTH CARE EDUCATION/TRAINING PROGRAM

## 2024-06-17 ENCOUNTER — LAB (OUTPATIENT)
Dept: LAB | Facility: LAB | Age: 64
End: 2024-06-17
Payer: COMMERCIAL

## 2024-06-17 ENCOUNTER — OFFICE VISIT (OUTPATIENT)
Dept: RHEUMATOLOGY | Facility: CLINIC | Age: 64
End: 2024-06-17
Payer: COMMERCIAL

## 2024-06-17 VITALS — WEIGHT: 121 LBS | SYSTOLIC BLOOD PRESSURE: 108 MMHG | DIASTOLIC BLOOD PRESSURE: 66 MMHG | BODY MASS INDEX: 22.13 KG/M2

## 2024-06-17 DIAGNOSIS — L40.50 PSA (PSORIATIC ARTHRITIS) (MULTI): ICD-10-CM

## 2024-06-17 DIAGNOSIS — L40.9 PSORIASIS: ICD-10-CM

## 2024-06-17 DIAGNOSIS — L40.50 PSA (PSORIATIC ARTHRITIS) (MULTI): Primary | ICD-10-CM

## 2024-06-17 LAB
ALBUMIN SERPL BCP-MCNC: 4.6 G/DL (ref 3.4–5)
ALP SERPL-CCNC: 48 U/L (ref 33–136)
ALT SERPL W P-5'-P-CCNC: 11 U/L (ref 7–45)
AST SERPL W P-5'-P-CCNC: 16 U/L (ref 9–39)
BASOPHILS # BLD AUTO: 0.07 X10*3/UL (ref 0–0.1)
BASOPHILS NFR BLD AUTO: 1.2 %
BILIRUB DIRECT SERPL-MCNC: 0.1 MG/DL (ref 0–0.3)
BILIRUB SERPL-MCNC: 0.5 MG/DL (ref 0–1.2)
CREAT SERPL-MCNC: 0.7 MG/DL (ref 0.5–1.05)
CRP SERPL-MCNC: <0.1 MG/DL
EGFRCR SERPLBLD CKD-EPI 2021: >90 ML/MIN/1.73M*2
EOSINOPHIL # BLD AUTO: 0.34 X10*3/UL (ref 0–0.7)
EOSINOPHIL NFR BLD AUTO: 5.6 %
ERYTHROCYTE [DISTWIDTH] IN BLOOD BY AUTOMATED COUNT: 13.2 % (ref 11.5–14.5)
HCT VFR BLD AUTO: 47.3 % (ref 36–46)
HGB BLD-MCNC: 15.8 G/DL (ref 12–16)
IMM GRANULOCYTES # BLD AUTO: 0.01 X10*3/UL (ref 0–0.7)
IMM GRANULOCYTES NFR BLD AUTO: 0.2 % (ref 0–0.9)
LYMPHOCYTES # BLD AUTO: 3.54 X10*3/UL (ref 1.2–4.8)
LYMPHOCYTES NFR BLD AUTO: 58.4 %
MCH RBC QN AUTO: 32.6 PG (ref 26–34)
MCHC RBC AUTO-ENTMCNC: 33.4 G/DL (ref 32–36)
MCV RBC AUTO: 98 FL (ref 80–100)
MONOCYTES # BLD AUTO: 0.51 X10*3/UL (ref 0.1–1)
MONOCYTES NFR BLD AUTO: 8.4 %
NEUTROPHILS # BLD AUTO: 1.59 X10*3/UL (ref 1.2–7.7)
NEUTROPHILS NFR BLD AUTO: 26.2 %
NRBC BLD-RTO: 0 /100 WBCS (ref 0–0)
PLATELET # BLD AUTO: 268 X10*3/UL (ref 150–450)
PROT SERPL-MCNC: 6.7 G/DL (ref 6.4–8.2)
RBC # BLD AUTO: 4.85 X10*6/UL (ref 4–5.2)
WBC # BLD AUTO: 6.1 X10*3/UL (ref 4.4–11.3)

## 2024-06-17 PROCEDURE — 36415 COLL VENOUS BLD VENIPUNCTURE: CPT

## 2024-06-17 PROCEDURE — 85025 COMPLETE CBC W/AUTO DIFF WBC: CPT

## 2024-06-17 PROCEDURE — 86140 C-REACTIVE PROTEIN: CPT

## 2024-06-17 PROCEDURE — 99214 OFFICE O/P EST MOD 30 MIN: CPT | Performed by: INTERNAL MEDICINE

## 2024-06-17 PROCEDURE — 82565 ASSAY OF CREATININE: CPT

## 2024-06-17 PROCEDURE — 80076 HEPATIC FUNCTION PANEL: CPT

## 2024-06-17 NOTE — PROGRESS NOTES
"Recheck PSA    doing well with increase in Leflunomide.    HPI - \"better\"  She does have some incr sx with weather changes but doing better.  AM stiffness up to 1.5 hrs.  L knee pain, ?if any swelling.  Ps is good.  No CP, resp, or GI other than gas (chronic)    PE  NAD  RRR no r/m/g  CTA  No edema  No synovitis    A/P - Ps and PsA - doing well  Reviewed prev labs  Check labs  Primary treats OP  Reeval 3 mo or sooner PRN  "

## 2024-07-14 DIAGNOSIS — M06.9 RHEUMATOID ARTHRITIS, INVOLVING UNSPECIFIED SITE, UNSPECIFIED WHETHER RHEUMATOID FACTOR PRESENT (MULTI): ICD-10-CM

## 2024-07-15 RX ORDER — LEFLUNOMIDE 20 MG/1
20 TABLET ORAL DAILY
Qty: 90 TABLET | Refills: 0 | Status: SHIPPED | OUTPATIENT
Start: 2024-07-15

## 2024-07-21 DIAGNOSIS — M06.9 RHEUMATOID ARTHRITIS, INVOLVING UNSPECIFIED SITE, UNSPECIFIED WHETHER RHEUMATOID FACTOR PRESENT (MULTI): ICD-10-CM

## 2024-07-22 RX ORDER — NABUMETONE 750 MG/1
TABLET, FILM COATED ORAL
Qty: 60 TABLET | Refills: 2 | Status: SHIPPED | OUTPATIENT
Start: 2024-07-22

## 2024-08-14 ENCOUNTER — LAB (OUTPATIENT)
Dept: LAB | Facility: LAB | Age: 64
End: 2024-08-14
Payer: COMMERCIAL

## 2024-08-14 DIAGNOSIS — E03.8 SUBCLINICAL HYPOTHYROIDISM: ICD-10-CM

## 2024-08-14 LAB — TSH SERPL-ACNC: 3.51 MIU/L (ref 0.44–3.98)

## 2024-08-14 PROCEDURE — 36415 COLL VENOUS BLD VENIPUNCTURE: CPT

## 2024-08-20 DIAGNOSIS — M81.0 AGE-RELATED OSTEOPOROSIS WITHOUT CURRENT PATHOLOGICAL FRACTURE: ICD-10-CM

## 2024-08-21 RX ORDER — IBANDRONATE SODIUM 150 MG/1
150 TABLET, FILM COATED ORAL
Qty: 3 TABLET | Refills: 0 | Status: SHIPPED | OUTPATIENT
Start: 2024-08-21

## 2024-09-24 ENCOUNTER — OFFICE VISIT (OUTPATIENT)
Dept: RHEUMATOLOGY | Facility: CLINIC | Age: 64
End: 2024-09-24
Payer: COMMERCIAL

## 2024-09-24 ENCOUNTER — LAB (OUTPATIENT)
Dept: LAB | Facility: LAB | Age: 64
End: 2024-09-24
Payer: COMMERCIAL

## 2024-09-24 VITALS — SYSTOLIC BLOOD PRESSURE: 118 MMHG | DIASTOLIC BLOOD PRESSURE: 64 MMHG | BODY MASS INDEX: 21.58 KG/M2 | WEIGHT: 118 LBS

## 2024-09-24 DIAGNOSIS — L40.50 PSA (PSORIATIC ARTHRITIS) (MULTI): ICD-10-CM

## 2024-09-24 DIAGNOSIS — M06.9 RHEUMATOID ARTHRITIS, INVOLVING UNSPECIFIED SITE, UNSPECIFIED WHETHER RHEUMATOID FACTOR PRESENT (MULTI): ICD-10-CM

## 2024-09-24 PROCEDURE — 82565 ASSAY OF CREATININE: CPT

## 2024-09-24 PROCEDURE — 86140 C-REACTIVE PROTEIN: CPT

## 2024-09-24 PROCEDURE — 99214 OFFICE O/P EST MOD 30 MIN: CPT | Performed by: INTERNAL MEDICINE

## 2024-09-24 PROCEDURE — 36415 COLL VENOUS BLD VENIPUNCTURE: CPT

## 2024-09-24 PROCEDURE — 80076 HEPATIC FUNCTION PANEL: CPT

## 2024-09-24 PROCEDURE — 85025 COMPLETE CBC W/AUTO DIFF WBC: CPT

## 2024-09-24 RX ORDER — LEFLUNOMIDE 20 MG/1
20 TABLET ORAL DAILY
Qty: 90 TABLET | Refills: 1 | Status: SHIPPED | OUTPATIENT
Start: 2024-09-24

## 2024-09-24 NOTE — PROGRESS NOTES
Recheck  PsA  Increased stiffness AM and with rainy damp weather with resolution after hot shower.  Slight relief with nabumetone as needed.    HPI - she has good and bad days.  Mornings are the hardest.  She takes nabumetone PRN, maybe 2-3 times/wk.  No swelling.  AM stiffness - hot shower helps.  No CP, resp, or GI.  No Ps.  She had 1 blister on her hand and 2 on her foot, now resolved.      PE  NAD  RRR no r/m/g  CTA  No edema  No synovitis    A/P - Ps, PsA - intermittent pain likely d/t OA  Recommend taking nabumetone before the pain gets bad.  Primary treats OP  Reviewed prev labs - sl incr hematocrit  Check labs  Follow up 3 mo

## 2024-09-25 LAB
ALBUMIN SERPL BCP-MCNC: 4.8 G/DL (ref 3.4–5)
ALP SERPL-CCNC: 40 U/L (ref 33–136)
ALT SERPL W P-5'-P-CCNC: 12 U/L (ref 7–45)
AST SERPL W P-5'-P-CCNC: 15 U/L (ref 9–39)
BASOPHILS # BLD AUTO: 0.07 X10*3/UL (ref 0–0.1)
BASOPHILS NFR BLD AUTO: 1.2 %
BILIRUB DIRECT SERPL-MCNC: 0.1 MG/DL (ref 0–0.3)
BILIRUB SERPL-MCNC: 0.5 MG/DL (ref 0–1.2)
CREAT SERPL-MCNC: 0.73 MG/DL (ref 0.5–1.05)
CRP SERPL-MCNC: 0.11 MG/DL
EGFRCR SERPLBLD CKD-EPI 2021: >90 ML/MIN/1.73M*2
EOSINOPHIL # BLD AUTO: 0.27 X10*3/UL (ref 0–0.7)
EOSINOPHIL NFR BLD AUTO: 4.5 %
ERYTHROCYTE [DISTWIDTH] IN BLOOD BY AUTOMATED COUNT: 13.5 % (ref 11.5–14.5)
HCT VFR BLD AUTO: 47.5 % (ref 36–46)
HGB BLD-MCNC: 15.8 G/DL (ref 12–16)
IMM GRANULOCYTES # BLD AUTO: 0.01 X10*3/UL (ref 0–0.7)
IMM GRANULOCYTES NFR BLD AUTO: 0.2 % (ref 0–0.9)
LYMPHOCYTES # BLD AUTO: 3.84 X10*3/UL (ref 1.2–4.8)
LYMPHOCYTES NFR BLD AUTO: 64.2 %
MCH RBC QN AUTO: 32.4 PG (ref 26–34)
MCHC RBC AUTO-ENTMCNC: 33.3 G/DL (ref 32–36)
MCV RBC AUTO: 98 FL (ref 80–100)
MONOCYTES # BLD AUTO: 0.48 X10*3/UL (ref 0.1–1)
MONOCYTES NFR BLD AUTO: 8 %
NEUTROPHILS # BLD AUTO: 1.31 X10*3/UL (ref 1.2–7.7)
NEUTROPHILS NFR BLD AUTO: 21.9 %
NRBC BLD-RTO: 0 /100 WBCS (ref 0–0)
PLATELET # BLD AUTO: 269 X10*3/UL (ref 150–450)
PROT SERPL-MCNC: 7.1 G/DL (ref 6.4–8.2)
RBC # BLD AUTO: 4.87 X10*6/UL (ref 4–5.2)
WBC # BLD AUTO: 6 X10*3/UL (ref 4.4–11.3)

## 2024-10-17 ENCOUNTER — LAB (OUTPATIENT)
Dept: LAB | Facility: LAB | Age: 64
End: 2024-10-17
Payer: COMMERCIAL

## 2024-10-17 DIAGNOSIS — L40.3 PUSTULOSIS PALMARIS ET PLANTARIS: Primary | ICD-10-CM

## 2024-10-17 PROCEDURE — 86481 TB AG RESPONSE T-CELL SUSP: CPT

## 2024-10-17 PROCEDURE — 36415 COLL VENOUS BLD VENIPUNCTURE: CPT

## 2024-10-19 LAB
NIL(NEG) CONTROL SPOT COUNT: NORMAL
PANEL A SPOT COUNT: 2
PANEL B SPOT COUNT: 0
POS CONTROL SPOT COUNT: NORMAL
T-SPOT. TB INTERPRETATION: NEGATIVE

## 2024-10-24 DIAGNOSIS — M06.9 RHEUMATOID ARTHRITIS, INVOLVING UNSPECIFIED SITE, UNSPECIFIED WHETHER RHEUMATOID FACTOR PRESENT (MULTI): ICD-10-CM

## 2024-10-24 RX ORDER — NABUMETONE 750 MG/1
TABLET, FILM COATED ORAL
Qty: 60 TABLET | Refills: 5 | Status: SHIPPED | OUTPATIENT
Start: 2024-10-24

## 2024-11-14 ENCOUNTER — APPOINTMENT (OUTPATIENT)
Dept: PRIMARY CARE | Facility: CLINIC | Age: 64
End: 2024-11-14
Payer: COMMERCIAL

## 2024-11-15 ENCOUNTER — HOSPITAL ENCOUNTER (OUTPATIENT)
Dept: RADIOLOGY | Facility: HOSPITAL | Age: 64
Discharge: HOME | End: 2024-11-15
Payer: COMMERCIAL

## 2024-11-15 DIAGNOSIS — R92.8 OTHER ABNORMAL AND INCONCLUSIVE FINDINGS ON DIAGNOSTIC IMAGING OF BREAST: ICD-10-CM

## 2024-11-15 DIAGNOSIS — M81.0 AGE-RELATED OSTEOPOROSIS WITHOUT CURRENT PATHOLOGICAL FRACTURE: ICD-10-CM

## 2024-11-15 PROCEDURE — 77061 BREAST TOMOSYNTHESIS UNI: CPT | Mod: LT

## 2024-11-19 RX ORDER — IBANDRONATE SODIUM 150 MG/1
150 TABLET, FILM COATED ORAL
Qty: 3 TABLET | Refills: 0 | Status: SHIPPED | OUTPATIENT
Start: 2024-11-19

## 2024-12-11 PROBLEM — E78.00 PURE HYPERCHOLESTEROLEMIA: Status: ACTIVE | Noted: 2021-06-16

## 2024-12-11 RX ORDER — METHOTREXATE 2.5 MG/1
TABLET ORAL
COMMUNITY
Start: 2023-05-19 | End: 2024-12-16 | Stop reason: ALTCHOICE

## 2024-12-11 RX ORDER — FOLIC ACID 1 MG/1
1 TABLET ORAL DAILY
COMMUNITY
Start: 2023-06-06 | End: 2024-12-16 | Stop reason: ALTCHOICE

## 2024-12-15 NOTE — PROGRESS NOTES
[facility]: [facility]  PROGRESS NOTE      Ammy Mar is a 64 y.o. female that is presenting today for 6 month follow up on medical conditions.  She last saw Dr. Guerin on 5/14/2024 for her annual medicare wellness exam.  She has a PMH of Hypercholesterolemia, Vitamin D Deficiency, Osteoporosis, Arthritis, Psoriasis and Smoking.  The patient has no concerns today.  Does need a refill on boniva    Assessment/Plan   Assessment & Plan  Arthritis  Rheumatoid arthritis  Follow with rheumatology Dr. Zelaya  Continue arava 20mg daily    Psoriasis  Continue taltz injections every 28 days  Follows with dermatology    Senile osteoporosis  Continue boniva 150mg every 30 days  Follow up with rheumatology Dr. Zelaya as scheduled  Last dexa scan 2/3/2023    Cigarette smoker  Smokes 1/2 - 3/4 ppd   Declines smoking cessation material today  States she will quit on her own         Age-related osteoporosis without current pathological fracture    Orders:    ibandronate (Boniva) 150 mg tablet; Take 1 tablet (150 mg) by mouth every 30 (thirty) days. Take in morning with full glass of water on an empty stomach. No food, drink, meds, or lying down for 60 minutes after.      Subjective   HPI  Review of Systems   Constitutional: Negative.    Respiratory: Negative.     Cardiovascular: Negative.    Gastrointestinal: Negative.    Skin: Negative.    Neurological: Negative.    Psychiatric/Behavioral: Negative.        Objective   There were no vitals filed for this visit.   There is no height or weight on file to calculate BMI.  Physical Exam  Vitals reviewed.   Constitutional:       Appearance: Normal appearance.   Cardiovascular:      Rate and Rhythm: Normal rate and regular rhythm.      Pulses: Normal pulses.      Heart sounds: Normal heart sounds.   Pulmonary:      Effort: Pulmonary effort is normal.      Breath sounds: Normal breath sounds.   Abdominal:      General: Abdomen is flat. Bowel sounds are normal.       "Palpations: Abdomen is soft.   Skin:     General: Skin is warm and dry.   Neurological:      General: No focal deficit present.      Mental Status: She is alert and oriented to person, place, and time.   Psychiatric:         Mood and Affect: Mood normal.         Behavior: Behavior normal.         Thought Content: Thought content normal.         Judgment: Judgment normal.       Vitals:    12/16/24 0853   BP: 139/79   Pulse: 82   Temp: 36.4 °C (97.5 °F)   SpO2: 98%   '  Diagnostic Results   Lab Results   Component Value Date    GLUCOSE 101 (H) 05/08/2024    CALCIUM 9.4 05/08/2024     05/08/2024    K 4.0 05/08/2024    CO2 25 05/08/2024     (H) 05/08/2024    BUN 22 05/08/2024    CREATININE 0.73 09/24/2024     Lab Results   Component Value Date    ALT 12 09/24/2024    AST 15 09/24/2024    ALKPHOS 40 09/24/2024    BILITOT 0.5 09/24/2024     Lab Results   Component Value Date    WBC 6.0 09/24/2024    HGB 15.8 09/24/2024    HCT 47.5 (H) 09/24/2024    MCV 98 09/24/2024     09/24/2024     Lab Results   Component Value Date    CHOL 220 (H) 05/08/2024    CHOL 183 01/18/2023    CHOL 206 (H) 06/25/2022     Lab Results   Component Value Date    HDL 54.3 05/08/2024    HDL 59 01/18/2023    HDL 64 06/25/2022     Lab Results   Component Value Date    LDLCALC 142 (H) 05/08/2024    LDLCALC 112 01/18/2023    LDLCALC 127 06/25/2022     Lab Results   Component Value Date    TRIG 117 05/08/2024    TRIG 59 01/18/2023    TRIG 73 06/25/2022     No components found for: \"CHOLHDL\"  Lab Results   Component Value Date    HGBA1C 5.1 05/08/2024     Other labs not included in the list above were reviewed either before or during this encounter.    History    Past Medical History:   Diagnosis Date    Cough 05/10/2018    Elevated cholesterol 06/16/2021    Hand pain 02/09/2024    Laceration of finger 02/09/2024    RA (rheumatoid arthritis) (Multi)      Past Surgical History:   Procedure Laterality Date    BACK SURGERY      CARPAL " TUNNEL RELEASE Right     HYSTERECTOMY       Family History   Problem Relation Name Age of Onset    Diabetes Mother      Heart disease Mother      Hypertension Mother      Bone cancer Father      Other (multiple health issues) Sister      Prostate cancer Brother       Social History     Socioeconomic History    Marital status:      Spouse name: Not on file    Number of children: Not on file    Years of education: Not on file    Highest education level: Not on file   Occupational History    Not on file   Tobacco Use    Smoking status: Every Day     Current packs/day: 0.25     Average packs/day: 0.3 packs/day for 33.0 years (8.3 ttl pk-yrs)     Types: Cigarettes    Smokeless tobacco: Never   Vaping Use    Vaping status: Never Used   Substance and Sexual Activity    Alcohol use: Yes     Comment: rarely    Drug use: Never    Sexual activity: Not on file   Other Topics Concern    Not on file   Social History Narrative    Not on file     Social Drivers of Health     Financial Resource Strain: Not on file   Food Insecurity: Not on file   Transportation Needs: Not on file   Physical Activity: Not on file   Stress: Not on file   Social Connections: Not on file   Intimate Partner Violence: Not on file   Housing Stability: Not on file     Allergies   Allergen Reactions    Ciprofloxacin Hives and Unknown    Erythromycin Nausea/vomiting and Unknown    Macrolide Antibiotics GI Upset and Nausea/vomiting     Other reaction(s): Vomiting    Penicillins Hives and Unknown    Sulfa (Sulfonamide Antibiotics) GI Upset, Other and Nausea And Vomiting     Current Outpatient Medications on File Prior to Visit   Medication Sig Dispense Refill    folic acid (Folvite) 1 mg tablet Take 1 tablet (1 mg) by mouth once daily.      methotrexate (Trexall) 2.5 mg tablet TAKE 10 TABLETS BY MOUTH EVERY MONDAY      BIOTIN ORAL Take by mouth.      ibandronate (Boniva) 150 mg tablet TAKE 1 TABLET (150 MG) BY MOUTH EVERY 30 (THIRTY) DAYS. TAKE IN  MORNING WITH FULL GLASS OF WATER ON AN EMPTY STOMACH. NO FOOD, DRINK, MEDS, OR LYING DOWN FOR 60 MINUTES AFTER. 3 tablet 0    leflunomide (Arava) 20 mg tablet Take 1 tablet (20 mg) by mouth once daily. 90 tablet 1    nabumetone (Relafen) 750 mg tablet TAKE 1 TABLET BY MOUTH 2 TIMES A DAY AS NEEDED FOR MILD PAIN (1 - 3) OR MODERATE PAIN (4 - 6). 60 tablet 5    Taltz Autoinjector 80 mg/mL injection Inject under the skin every 28 (twenty-eight) days.       No current facility-administered medications on file prior to visit.     Immunization History   Administered Date(s) Administered    Moderna SARS-CoV-2 Vaccination 03/24/2021, 04/23/2021    Pfizer COVID-19 vaccine, 12 years and older, (30mcg/0.3mL) (Comirnaty) 10/04/2024    Pfizer COVID-19 vaccine, bivalent, age 12 years and older (30 mcg/0.3 mL) 09/27/2022    Pfizer Gray Cap SARS-CoV-2 05/06/2022    Pfizer Purple Cap SARS-CoV-2 11/19/2021    Tdap vaccine, age 7 year and older (BOOSTRIX, ADACEL) 01/12/2020, 03/23/2020     Patient's medical history was reviewed and updated either before or during this encounter.       Unique Asencio, APRN-CNP

## 2024-12-15 NOTE — ASSESSMENT & PLAN NOTE
Continue boniva 150mg every 30 days  Follow up with rheumatology Dr. Zelaya as scheduled  Last dexa scan 2/3/2023

## 2024-12-16 ENCOUNTER — OFFICE VISIT (OUTPATIENT)
Dept: PRIMARY CARE | Facility: CLINIC | Age: 64
End: 2024-12-16
Payer: COMMERCIAL

## 2024-12-16 VITALS
TEMPERATURE: 97.5 F | HEIGHT: 62 IN | DIASTOLIC BLOOD PRESSURE: 79 MMHG | OXYGEN SATURATION: 98 % | SYSTOLIC BLOOD PRESSURE: 139 MMHG | WEIGHT: 124 LBS | BODY MASS INDEX: 22.82 KG/M2 | HEART RATE: 82 BPM

## 2024-12-16 DIAGNOSIS — M19.90 ARTHRITIS: Primary | ICD-10-CM

## 2024-12-16 DIAGNOSIS — L40.9 PSORIASIS: ICD-10-CM

## 2024-12-16 DIAGNOSIS — F17.210 CIGARETTE SMOKER: ICD-10-CM

## 2024-12-16 DIAGNOSIS — M81.0 SENILE OSTEOPOROSIS: ICD-10-CM

## 2024-12-16 DIAGNOSIS — M81.0 AGE-RELATED OSTEOPOROSIS WITHOUT CURRENT PATHOLOGICAL FRACTURE: ICD-10-CM

## 2024-12-16 PROCEDURE — 99214 OFFICE O/P EST MOD 30 MIN: CPT | Performed by: NURSE PRACTITIONER

## 2024-12-16 PROCEDURE — 4004F PT TOBACCO SCREEN RCVD TLK: CPT | Performed by: NURSE PRACTITIONER

## 2024-12-16 PROCEDURE — 3008F BODY MASS INDEX DOCD: CPT | Performed by: NURSE PRACTITIONER

## 2024-12-16 RX ORDER — IBANDRONATE SODIUM 150 MG/1
150 TABLET, FILM COATED ORAL
Qty: 3 TABLET | Refills: 0 | Status: SHIPPED | OUTPATIENT
Start: 2024-12-16

## 2024-12-16 ASSESSMENT — ENCOUNTER SYMPTOMS
CARDIOVASCULAR NEGATIVE: 1
CONSTITUTIONAL NEGATIVE: 1
PSYCHIATRIC NEGATIVE: 1
DEPRESSION: 0
LOSS OF SENSATION IN FEET: 0
OCCASIONAL FEELINGS OF UNSTEADINESS: 0
RESPIRATORY NEGATIVE: 1
NEUROLOGICAL NEGATIVE: 1
GASTROINTESTINAL NEGATIVE: 1

## 2024-12-16 ASSESSMENT — PATIENT HEALTH QUESTIONNAIRE - PHQ9
1. LITTLE INTEREST OR PLEASURE IN DOING THINGS: NOT AT ALL
SUM OF ALL RESPONSES TO PHQ9 QUESTIONS 1 AND 2: 0
2. FEELING DOWN, DEPRESSED OR HOPELESS: NOT AT ALL

## 2024-12-16 ASSESSMENT — PAIN SCALES - GENERAL: PAINLEVEL_OUTOF10: 3

## 2024-12-16 NOTE — ASSESSMENT & PLAN NOTE
Smokes 1/2 - 3/4 ppd   Declines smoking cessation material today  States she will quit on her own

## 2025-01-08 ENCOUNTER — APPOINTMENT (OUTPATIENT)
Dept: RHEUMATOLOGY | Facility: CLINIC | Age: 65
End: 2025-01-08
Payer: COMMERCIAL

## 2025-02-12 ENCOUNTER — OFFICE VISIT (OUTPATIENT)
Dept: RHEUMATOLOGY | Facility: CLINIC | Age: 65
End: 2025-02-12
Payer: COMMERCIAL

## 2025-02-12 VITALS — BODY MASS INDEX: 21.95 KG/M2 | DIASTOLIC BLOOD PRESSURE: 70 MMHG | WEIGHT: 120 LBS | SYSTOLIC BLOOD PRESSURE: 128 MMHG

## 2025-02-12 DIAGNOSIS — L40.9 PSORIASIS: ICD-10-CM

## 2025-02-12 DIAGNOSIS — L40.50 PSA (PSORIATIC ARTHRITIS) (MULTI): Primary | ICD-10-CM

## 2025-02-12 DIAGNOSIS — Z79.899 LONG-TERM USE OF HIGH-RISK MEDICATION: ICD-10-CM

## 2025-02-12 DIAGNOSIS — M79.645 PAIN OF LEFT THUMB: ICD-10-CM

## 2025-02-12 PROCEDURE — 20600 DRAIN/INJ JOINT/BURSA W/O US: CPT | Mod: LT | Performed by: INTERNAL MEDICINE

## 2025-02-12 PROCEDURE — 99214 OFFICE O/P EST MOD 30 MIN: CPT | Performed by: INTERNAL MEDICINE

## 2025-02-12 PROCEDURE — 99214 OFFICE O/P EST MOD 30 MIN: CPT | Mod: 25 | Performed by: INTERNAL MEDICINE

## 2025-02-12 PROCEDURE — 2500000004 HC RX 250 GENERAL PHARMACY W/ HCPCS (ALT 636 FOR OP/ED): Performed by: INTERNAL MEDICINE

## 2025-02-12 RX ORDER — TRIAMCINOLONE ACETONIDE 40 MG/ML
5 INJECTION, SUSPENSION INTRA-ARTICULAR; INTRAMUSCULAR
Status: COMPLETED | OUTPATIENT
Start: 2025-02-12 | End: 2025-02-12

## 2025-02-12 RX ADMIN — TRIAMCINOLONE ACETONIDE 5 MG: 40 INJECTION, SUSPENSION INTRA-ARTICULAR; INTRAMUSCULAR at 20:02

## 2025-02-12 NOTE — PROGRESS NOTES
"Recheck  PsA   Taltz is off formulary due to insurance.  Currently working on possible generic.    Doing well     HPI - Her insurance won't cover taltz - she found out today.  They want her on a \"generic\"  She said that she had gotten a letter in Dec approving it for a year.  She takes nabumetone in AM, and pain/stiffness ease up when she finishes shower.  Her L thumb has been hurting since she tripped and banged it 2 mo ago.  Incr aching with temp changes.  No CP, resp, or GI.  No Ps.      PE  NAD  RRR no r/m/g  CTA  No edema  Min-mild synovitis L 1st MCP    A/P - Ps and PsA - gen doing well other than thumb pain since hitting it 2 mo ago  She has been on otezla, tremfya, skyrizi, and Methotrexate, but taltz was the only thing that worked.  Will appeal if insurance denies taltz.  L 1st MCP injection - expl risks/benefits.  Pt gave written consent.  Aseptic tech, injected with 5mg kenalog and 0.1 ml 1% lido  Reviewed prev labs  Check labs  Primary treats OP  Follow up 3 mo  Patient ID: Ammy Mar is a 64 y.o. female.    Small Joint Injection/Arthrocentesis: L thumb MCP on 2/12/2025 8:02 PM  Indications: pain  Medications: 5 mg triamcinolone acetonide 40 mg/mL  Procedure, treatment alternatives, risks and benefits explained, specific risks discussed. Consent was given by the patient.         "

## 2025-02-15 LAB
ALBUMIN SERPL-MCNC: 4.3 G/DL (ref 3.6–5.1)
ALBUMIN/GLOB SERPL: 2.4 (CALC) (ref 1–2.5)
ALP SERPL-CCNC: 40 U/L (ref 37–153)
ALT SERPL-CCNC: 10 U/L (ref 6–29)
AST SERPL-CCNC: 15 U/L (ref 10–35)
BASOPHILS # BLD AUTO: 72 CELLS/UL (ref 0–200)
BASOPHILS NFR BLD AUTO: 1.2 %
BILIRUB DIRECT SERPL-MCNC: 0.1 MG/DL
BILIRUB INDIRECT SERPL-MCNC: 0.2 MG/DL (CALC) (ref 0.2–1.2)
BILIRUB SERPL-MCNC: 0.3 MG/DL (ref 0.2–1.2)
CREAT SERPL-MCNC: 0.63 MG/DL (ref 0.5–1.05)
CRP SERPL-MCNC: NORMAL MG/L
EGFRCR SERPLBLD CKD-EPI 2021: 99 ML/MIN/1.73M2
EOSINOPHIL # BLD AUTO: 300 CELLS/UL (ref 15–500)
EOSINOPHIL NFR BLD AUTO: 5 %
ERYTHROCYTE [DISTWIDTH] IN BLOOD BY AUTOMATED COUNT: 11.8 % (ref 11–15)
GLOBULIN SER CALC-MCNC: 1.8 G/DL (CALC) (ref 1.9–3.7)
HCT VFR BLD AUTO: 43.4 % (ref 35–45)
HGB BLD-MCNC: 14.3 G/DL (ref 11.7–15.5)
LYMPHOCYTES # BLD AUTO: 3348 CELLS/UL (ref 850–3900)
LYMPHOCYTES NFR BLD AUTO: 55.8 %
MCH RBC QN AUTO: 32.1 PG (ref 27–33)
MCHC RBC AUTO-ENTMCNC: 32.9 G/DL (ref 32–36)
MCV RBC AUTO: 97.5 FL (ref 80–100)
MONOCYTES # BLD AUTO: 516 CELLS/UL (ref 200–950)
MONOCYTES NFR BLD AUTO: 8.6 %
NEUTROPHILS # BLD AUTO: 1764 CELLS/UL (ref 1500–7800)
NEUTROPHILS NFR BLD AUTO: 29.4 %
PLATELET # BLD AUTO: 242 THOUSAND/UL (ref 140–400)
PMV BLD REES-ECKER: 10.3 FL (ref 7.5–12.5)
PROT SERPL-MCNC: 6.1 G/DL (ref 6.1–8.1)
RBC # BLD AUTO: 4.45 MILLION/UL (ref 3.8–5.1)
WBC # BLD AUTO: 6 THOUSAND/UL (ref 3.8–10.8)

## 2025-02-17 LAB
ALBUMIN SERPL-MCNC: 4.3 G/DL (ref 3.6–5.1)
ALBUMIN/GLOB SERPL: 2.4 (CALC) (ref 1–2.5)
ALP SERPL-CCNC: 40 U/L (ref 37–153)
ALT SERPL-CCNC: 10 U/L (ref 6–29)
AST SERPL-CCNC: 15 U/L (ref 10–35)
BASOPHILS # BLD AUTO: 72 CELLS/UL (ref 0–200)
BASOPHILS NFR BLD AUTO: 1.2 %
BILIRUB DIRECT SERPL-MCNC: 0.1 MG/DL
BILIRUB INDIRECT SERPL-MCNC: 0.2 MG/DL (CALC) (ref 0.2–1.2)
BILIRUB SERPL-MCNC: 0.3 MG/DL (ref 0.2–1.2)
CREAT SERPL-MCNC: 0.63 MG/DL (ref 0.5–1.05)
CRP SERPL-MCNC: <3 MG/L
EGFRCR SERPLBLD CKD-EPI 2021: 99 ML/MIN/1.73M2
EOSINOPHIL # BLD AUTO: 300 CELLS/UL (ref 15–500)
EOSINOPHIL NFR BLD AUTO: 5 %
ERYTHROCYTE [DISTWIDTH] IN BLOOD BY AUTOMATED COUNT: 11.8 % (ref 11–15)
GLOBULIN SER CALC-MCNC: 1.8 G/DL (CALC) (ref 1.9–3.7)
HCT VFR BLD AUTO: 43.4 % (ref 35–45)
HGB BLD-MCNC: 14.3 G/DL (ref 11.7–15.5)
LYMPHOCYTES # BLD AUTO: 3348 CELLS/UL (ref 850–3900)
LYMPHOCYTES NFR BLD AUTO: 55.8 %
MCH RBC QN AUTO: 32.1 PG (ref 27–33)
MCHC RBC AUTO-ENTMCNC: 32.9 G/DL (ref 32–36)
MCV RBC AUTO: 97.5 FL (ref 80–100)
MONOCYTES # BLD AUTO: 516 CELLS/UL (ref 200–950)
MONOCYTES NFR BLD AUTO: 8.6 %
NEUTROPHILS # BLD AUTO: 1764 CELLS/UL (ref 1500–7800)
NEUTROPHILS NFR BLD AUTO: 29.4 %
PLATELET # BLD AUTO: 242 THOUSAND/UL (ref 140–400)
PMV BLD REES-ECKER: 10.3 FL (ref 7.5–12.5)
PROT SERPL-MCNC: 6.1 G/DL (ref 6.1–8.1)
RBC # BLD AUTO: 4.45 MILLION/UL (ref 3.8–5.1)
WBC # BLD AUTO: 6 THOUSAND/UL (ref 3.8–10.8)

## 2025-02-25 ENCOUNTER — TELEMEDICINE CLINICAL SUPPORT (OUTPATIENT)
Dept: SURGERY | Facility: CLINIC | Age: 65
End: 2025-02-25
Payer: COMMERCIAL

## 2025-02-25 VITALS — HEIGHT: 63 IN | WEIGHT: 119 LBS | BODY MASS INDEX: 21.09 KG/M2

## 2025-02-25 DIAGNOSIS — Z12.11 SPECIAL SCREENING FOR MALIGNANT NEOPLASMS, COLON: ICD-10-CM

## 2025-02-25 ASSESSMENT — ENCOUNTER SYMPTOMS
DEPRESSION: 0
LOSS OF SENSATION IN FEET: 0
OCCASIONAL FEELINGS OF UNSTEADINESS: 0

## 2025-02-25 ASSESSMENT — PAIN SCALES - GENERAL: PAINLEVEL_OUTOF10: 0-NO PAIN

## 2025-02-25 ASSESSMENT — PATIENT HEALTH QUESTIONNAIRE - PHQ9
2. FEELING DOWN, DEPRESSED OR HOPELESS: NOT AT ALL
1. LITTLE INTEREST OR PLEASURE IN DOING THINGS: NOT AT ALL
SUM OF ALL RESPONSES TO PHQ9 QUESTIONS 1 AND 2: 0

## 2025-02-25 NOTE — PATIENT INSTRUCTIONS
Please read the following immediately.   A Colonoscopy has been recommended to you.   This is an examination of your large intestine with a lighted flexible tube. You will be given sedative medication through an intravenous catheter and then the physician will pass the flexible tube into the rectum and through your entire large intestines. If you have a polyp (abnormal growth of tissue) it will be removed during the procedure. The physician may also take biopsies (small pieces of tissue for microscopic examination). The procedure will take about 45-60 minutes to complete and then you will rest in recovery for an additional 30-60 minutes while the sedation wears off.     Your colonoscopy will take place at: [address] [facility]. [facility], [address]    PURCHASE the following:        · Miralax (Glycolax) 8.3 oz (238 gm bottle) ?   ? Bisacodyl (Dulcolax laxative) 5mg   4 tablets - NOT suppositories   ? 64 oz of Gatorade or any non-carbonated clear liquid (Iced Tea, Crystal Light) Select green, yellow, or clear flavors (NO PURPLE OR RED)     If you take medication to thin your blood, such as Coumadin (Warfarin), Plavix (Clopidogrel), Xarelto (rivaroxaban) or Pradaxa (Dabigatran), Eliquis (Apizaban), Aggrenox (Aspirin/Dipyridamole) etc., ask the doctor that prescribed it for instructions prior to stopping. A baby Aspirin (81mg) may be continued.   STOP all fiber supplements or medications containing Iron 7 days prior to procedure.   Confirm that you have a  for the day of the procedure. You are not allowed to drive for 24 hours after your procedure.              ONE DAY BEFORE PROCEDURE    NO SOLID FOODS / NO ALCOHOL     Surgery scheduling department will call you in the afternoon the day before and let you know   the time of arrival for your procedure, if they have not called by 3pm please call [phone].     Clear liquids only ALL DAY     ? AVOID anything red or purple   ? NO milk products or non-dairy creamer.   ?  SEE LIST OF CLEAR LIQUIDS SUGGESTIONS   ? Speak with your primary care physician about your diabetic medications     STARTING PREP: DAY BEFORE COLONOSCOPY     ? 5:00 PM: Mix the 8.3 oz bottle of Miralax in 64 oz of Gatorade or chosen clear liquid.   ? Drink 32 oz (1/2 of the 64 oz bottle) solution at a rate of 8 ounces every 15 min. and take 2 Dulcolax tablets with this. Keep the remaining 32 ounces.   ? Continue with clear liquids until bedtime.     MORNING OF PROCEDURE     5 HOURS PRIOR TO ARRIVAL TIME:     DRINK THE REMAINING 32 OUNCES and 2 Dulcolax tablets.     You may continue to drink clear liquids up to 4 hours prior to the procedure.     Drinking liquids after this will cause us to cancel or postpone your procedure     ** Also no gum, hard candy, mints and tobacco products in these 4 hours.     Not stopping your blood thinner in time as directed by your physician will cause us to cancel and reschedule your procedure.     DIABETICS: DO NOT take oral medication   DIABETA, GLUCOPHAGE   METFORMIN or JANUVIA     - If you are Insulin dependent, do not take your morning dose of insulin.   All patients may take morning medications with sips of water.          SUGGESTED CLEAR LIQUID GUIDE INFORMATION   ? Gatorade or Powerade   ? Clear broth or bouillon - chicken or beef   ? Coffee or Tea (no milk or no-dairy creamer)   ? Carbonated and Non-Carbonated Soft Drinks   ? Ashu-Aid or Crystal Light   ? Strained Fruit Juices (no pulp)   ? Jell-O, Popsicles, or Italian Ice     PRODUCTS TO AVOID   ? Cream or non-dairy creamer   ? Orange, Grapefruit or Tomato Juice   ? Creamed Soups or any soup other than broth   ? Oatmeal   ? Cream of Wheat   ? Milk or milkshakes     THINGS TO BRING WITH YOU!   ? A RESPONSIBLE    ? INSURANCE CARDS   ? A PHOTO ID   ? MEDICATION LIST   ? DURABLE POWER OF  AND LIVING WILL

## 2025-02-25 NOTE — Clinical Note
Pls schedule pt for screening Colonoscopy with Dr. Boudreaux at [facility]. Surgery itinerary/bowel prep instructions reviewed with pt and sent to their MyChart.

## 2025-02-25 NOTE — PROGRESS NOTES
This is a 64 year old female who presents for telemedicine visit via telephone to discuss screening colonoscopy for Dr. Boudreaux.   Patient's last Colonoscopy was on 7/7/20 with Dr. Boudreaux at [facility]. Patient had 3 colon polyps and 2 colon polyps removed.     Patient denies change in bowel habits, diarrhea, constipation, change in caliber of the stool, blood or mucous in stool, rectal pain, fevers, unintentional weight loss. Patient also denies issues with abdominal pain, nausea, vomiting, acid reflux, indigestion.    Patient denies a family history of colorectal cancer, colon polyps, IBD, other gastro intestinal issues. Patient denies use of blood thinners, NSAIDs. Patient smokes 3/4 pack of cigarettes daily, denies drug use. Rare alcohol use. All pertinent medical history, surgical history, social and family history were reviewed and updated with the patient.    Patient denies taking Glucagon-like peptide-1 (GLP-1) Ozempic, Saxenda, Mounjaro, Tazeum, Trulicity, Lyxumia, Byetta, Byoureon. Patient advised that these medications are used for Type 2 diabetes but more so recently for weight loss management. One side effect of this medication is that it inhibits gastric emptying therefor causing much higher risk of aspirating and desaturating.    Patient is unable to be physically examined due to telephone visit but denies chest pain, shortness of breath, abdominal pain or tenderness, skin abnormalities. Denies knowledge of bright red blood per rectum or hemorrhoids.    Risks, benefits, and alternatives to colonoscopy were discussed. Alternatives including colo guard and FIT testing were discussed as well as their inability to remove polyps that were detected. Patient understands that a bowel prep must be completed for adequate evaluation of the colon, and inadequate prep may allow missed lesions. Patient understands a risk of perforation is less than 1 in 5000 colonoscopy, risk of serious bleeding or  abdominal pain is less than 1%. Patient agrees to proceed with colonoscopy with possible biopsy.    Bowel prep instructions were discussed in detail with patient and reviewed thoroughly. Bowel prep instructions will be mailed to patient's home address on file or sent to the patient's MyChart.  Colonoscopy procedure, risks, and benefits were explained and reviewed with patient. All questions and concerns were addressed this visit.    Patient to be scheduled for screening colonoscopy under MAC with Dr. Boudreaux at [facility].  Over 30 minutes was spent on this patient counter including televisit, patient counseling and education, assessment and plan, reviewing necessary labs and diagnostics, and discussing pertinent education.      Counseling:  Medication education:  Education:  Current Medication list reviewed and discussed, Understanding:  Patient expressed understanding, Adherence:  No barriers to adherence identified, Education:  Current Medication list reviewed and discussed, Understanding:  Patient expressed understanding, Adherence:  No barriers to adherence identified.     Order placed in Epic for Procedure: YES    Itinerary and Prep instructions sent: YES  If self-pay, Estimate letter sent: N/A      Last EGD: Never  Last COLON: 7/7/20    Do you have a Family history of colon cancer &/or colon polyps? No  Do you have any GI symptoms you are concerned about? No      Decision Tree Answer From: REQUIRED to complete    What is the patient’ sedation requirement?    Patient location for procedure: [facility]    What is your height? 5'3    What is your weight? 119lbs    What is your BMI? 21.08         Do you have breathing problems? No    Do you have Sleep Apnea? No    Home oxygen use? No         Are you non-ambulatory? No    Do you have severe anxiety or take pain medication on a regular basis? No    Recreational Drug Use? No         Are you an insulin dependent diabetic? No    Diabetic medications adjusted:  No         Are you currently taking any blood thinners? No    Do you have a History of Heart failure or mechanical valves? No  Have you had a stroke (CVA or TIA) in the last 3 months? No    Do you have a Defibrillator (AICD) or Pacemaker & what is the date of last interrogation? No    Are you currently on dialysis? No

## 2025-02-26 ENCOUNTER — TELEPHONE (OUTPATIENT)
Dept: SURGERY | Facility: CLINIC | Age: 65
End: 2025-02-26
Payer: COMMERCIAL

## 2025-02-26 NOTE — TELEPHONE ENCOUNTER
Patient called the office back today to schedule colonoscopy. Patient was scheduled for colon on 4/8/2025 with Dr. Boudreaux. OR schedulers were notified via secure chart. Patient will be sent instructions through the mail.    Sheyla Paredes CMA

## 2025-02-26 NOTE — TELEPHONE ENCOUNTER
Called patient today to schedule colonoscopy with Dr. Boudreaux. Patient at this time did not answer the phone, left message. Let patient know on message to call the office back at [phone] to schedule procedure.    Sheyla Paredes CMA

## 2025-03-11 ENCOUNTER — APPOINTMENT (OUTPATIENT)
Dept: SURGERY | Facility: CLINIC | Age: 65
End: 2025-03-11
Payer: COMMERCIAL

## 2025-03-18 DIAGNOSIS — M06.9 RHEUMATOID ARTHRITIS, INVOLVING UNSPECIFIED SITE, UNSPECIFIED WHETHER RHEUMATOID FACTOR PRESENT (MULTI): ICD-10-CM

## 2025-03-18 RX ORDER — LEFLUNOMIDE 20 MG/1
20 TABLET ORAL DAILY
Qty: 90 TABLET | Refills: 0 | Status: SHIPPED | OUTPATIENT
Start: 2025-03-18

## 2025-03-25 ENCOUNTER — APPOINTMENT (OUTPATIENT)
Dept: PREADMISSION TESTING | Facility: HOSPITAL | Age: 65
End: 2025-03-25
Payer: COMMERCIAL

## 2025-03-28 ENCOUNTER — PRE-ADMISSION TESTING (OUTPATIENT)
Dept: PREADMISSION TESTING | Facility: HOSPITAL | Age: 65
End: 2025-03-28
Payer: COMMERCIAL

## 2025-03-28 VITALS
HEART RATE: 85 BPM | WEIGHT: 120 LBS | OXYGEN SATURATION: 98 % | BODY MASS INDEX: 22.08 KG/M2 | SYSTOLIC BLOOD PRESSURE: 128 MMHG | RESPIRATION RATE: 16 BRPM | HEIGHT: 62 IN | DIASTOLIC BLOOD PRESSURE: 80 MMHG | TEMPERATURE: 97.2 F

## 2025-03-28 DIAGNOSIS — Z01.818 PREOP TESTING: Primary | ICD-10-CM

## 2025-03-28 LAB
ANION GAP SERPL CALCULATED.3IONS-SCNC: 10 MMOL/L (ref 10–20)
BUN SERPL-MCNC: 17 MG/DL (ref 6–23)
CALCIUM SERPL-MCNC: 9.4 MG/DL (ref 8.6–10.3)
CHLORIDE SERPL-SCNC: 106 MMOL/L (ref 98–107)
CO2 SERPL-SCNC: 28 MMOL/L (ref 21–32)
CREAT SERPL-MCNC: 0.79 MG/DL (ref 0.5–1.05)
EGFRCR SERPLBLD CKD-EPI 2021: 84 ML/MIN/1.73M*2
GLUCOSE SERPL-MCNC: 99 MG/DL (ref 74–99)
POTASSIUM SERPL-SCNC: 4.4 MMOL/L (ref 3.5–5.3)
SODIUM SERPL-SCNC: 140 MMOL/L (ref 136–145)

## 2025-03-28 PROCEDURE — 80048 BASIC METABOLIC PNL TOTAL CA: CPT

## 2025-03-28 PROCEDURE — 36415 COLL VENOUS BLD VENIPUNCTURE: CPT

## 2025-03-28 PROCEDURE — 99203 OFFICE O/P NEW LOW 30 MIN: CPT | Performed by: NURSE PRACTITIONER

## 2025-03-28 ASSESSMENT — DUKE ACTIVITY SCORE INDEX (DASI)
CAN YOU HAVE SEXUAL RELATIONS: YES
CAN YOU WALK A BLOCK OR TWO ON LEVEL GROUND: YES
CAN YOU WALK INDOORS, SUCH AS AROUND YOUR HOUSE: YES
CAN YOU RUN A SHORT DISTANCE: YES
DASI METS SCORE: 9.9
CAN YOU PARTICIPATE IN MODERATE RECREATIONAL ACTIVITIES LIKE GOLF, BOWLING, DANCING, DOUBLES TENNIS OR THROWING A BASEBALL OR FOOTBALL: YES
TOTAL_SCORE: 58.2
CAN YOU TAKE CARE OF YOURSELF (EAT, DRESS, BATHE, OR USE TOILET): YES
CAN YOU CLIMB A FLIGHT OF STAIRS OR WALK UP A HILL: YES
CAN YOU DO MODERATE WORK AROUND THE HOUSE LIKE VACUUMING, SWEEPING FLOORS OR CARRYING GROCERIES: YES
CAN YOU DO LIGHT WORK AROUND THE HOUSE LIKE DUSTING OR WASHING DISHES: YES
CAN YOU PARTICIPATE IN STRENOUS SPORTS LIKE SWIMMING, SINGLES TENNIS, FOOTBALL, BASKETBALL, OR SKIING: YES
CAN YOU DO HEAVY WORK AROUND THE HOUSE LIKE SCRUBBING FLOORS OR LIFTING AND MOVING HEAVY FURNITURE: YES
CAN YOU DO YARD WORK LIKE RAKING LEAVES, WEEDING OR PUSHING A MOWER: YES

## 2025-03-28 ASSESSMENT — ENCOUNTER SYMPTOMS
MUSCULOSKELETAL NEGATIVE: 1
RESPIRATORY NEGATIVE: 1
PSYCHIATRIC NEGATIVE: 1
CONSTITUTIONAL NEGATIVE: 1
HEMATOLOGIC/LYMPHATIC NEGATIVE: 1
NEUROLOGICAL NEGATIVE: 1
CARDIOVASCULAR NEGATIVE: 1
ENDOCRINE NEGATIVE: 1
GASTROINTESTINAL NEGATIVE: 1
EYES NEGATIVE: 1

## 2025-03-28 NOTE — H&P (VIEW-ONLY)
CPM/PAT Evaluation       Name: Ammy Mar (Ammy Mar)  /Age: [birth year]64 y.o.     In-Person       Chief Complaint: H/O colon polyps    HPI    Pt is a 64 year old female who is scheduled for a colon cancer screening. Pt reports she has a history of colon polyps and her last colonoscopy was 5 years ago. Pt denies nausea, vomiting, abdominal pain, diarrhea, constipation, melena , or hematochezia. Pt was scheduled for a colonoscopy. Pt denies CP, SOB, or dizziness.     Past Medical History:   Diagnosis Date    Osteoporosis     Psoriasis     Psoriatic arthritis (Multi)     RA (rheumatoid arthritis)      Past Surgical History:   Procedure Laterality Date    BACK SURGERY      x3    CARPAL TUNNEL RELEASE Right     COLONOSCOPY W/ POLYPECTOMY  2017    3 polyps removed, largest polyp removed 10mm    COLONOSCOPY W/ POLYPECTOMY  2020    with Dr. Boudreaux    HYSTERECTOMY       Social History     Tobacco Use    Smoking status: Every Day     Current packs/day: 0.75     Average packs/day: 0.8 packs/day for 33.0 years (24.8 ttl pk-yrs)     Types: Cigarettes     Passive exposure: Current    Smokeless tobacco: Never   Substance Use Topics    Alcohol use: Yes     Comment: rarely     Social History     Substance and Sexual Activity   Drug Use Never       Patient Sexual activity questions deferred to the physician.    Family History   Problem Relation Name Age of Onset    Diabetes Mother      Heart disease Mother      Hypertension Mother      Bone cancer Father      Other (multiple health issues) Sister      Prostate cancer Brother       Allergies   Allergen Reactions    Ciprofloxacin Hives and Unknown    Erythromycin Nausea/vomiting and Unknown    Macrolide Antibiotics GI Upset and Nausea/vomiting     Other reaction(s): Vomiting    Penicillins Hives and Unknown    Sulfa (Sulfonamide Antibiotics) GI Upset, Other and Nausea And Vomiting     Current Outpatient Medications   Medication Sig Dispense Refill    BIOTIN  ORAL Take by mouth once daily.      ibandronate (Boniva) 150 mg tablet Take 1 tablet (150 mg) by mouth every 30 (thirty) days. Take in morning with full glass of water on an empty stomach. No food, drink, meds, or lying down for 60 minutes after. (Patient not taking: Reported on 2/25/2025) 3 tablet 0    leflunomide (Arava) 20 mg tablet TAKE 1 TABLET BY MOUTH EVERY DAY 90 tablet 0    nabumetone (Relafen) 750 mg tablet TAKE 1 TABLET BY MOUTH 2 TIMES A DAY AS NEEDED FOR MILD PAIN (1 - 3) OR MODERATE PAIN (4 - 6). 60 tablet 5    Taltz Autoinjector 80 mg/mL injection Inject under the skin every 28 (twenty-eight) days.       No current facility-administered medications for this visit.     Review of Systems   Constitutional: Negative.    HENT: Negative.     Eyes: Negative.    Respiratory: Negative.     Cardiovascular: Negative.    Gastrointestinal: Negative.    Endocrine: Negative.    Genitourinary: Negative.    Musculoskeletal: Negative.    Skin: Negative.    Neurological: Negative.    Hematological: Negative.    Psychiatric/Behavioral: Negative.       Physical Exam  Vitals reviewed.   Constitutional:       Appearance: Normal appearance.   HENT:      Head: Normocephalic and atraumatic.      Nose: Nose normal.      Mouth/Throat:      Mouth: Mucous membranes are moist.      Pharynx: Oropharynx is clear.   Eyes:      Extraocular Movements: Extraocular movements intact.      Conjunctiva/sclera: Conjunctivae normal.      Pupils: Pupils are equal, round, and reactive to light.   Cardiovascular:      Rate and Rhythm: Normal rate and regular rhythm.      Pulses: Normal pulses.      Heart sounds: Normal heart sounds.   Pulmonary:      Effort: Pulmonary effort is normal.      Breath sounds: Normal breath sounds.   Abdominal:      General: Bowel sounds are normal.      Palpations: Abdomen is soft.   Musculoskeletal:         General: Normal range of motion.      Cervical back: Normal range of motion and neck supple.   Skin:      "General: Skin is warm and dry.   Neurological:      General: No focal deficit present.      Mental Status: She is alert and oriented to person, place, and time. Mental status is at baseline.   Psychiatric:         Mood and Affect: Mood normal.         Behavior: Behavior normal.         Thought Content: Thought content normal.         Judgment: Judgment normal.          PAT AIRWAY:   Airway:     Mallampati::  II    TM distance::  >3 FB    Neck ROM::  Full  normal      Visit Vitals  /80   Pulse 85   Temp 36.2 °C (97.2 °F) (Temporal)   Resp 16   Ht 1.575 m (5' 2\")   Wt 54.4 kg (120 lb)   SpO2 98%   BMI 21.95 kg/m²   OB Status Hysterectomy   Smoking Status Every Day   BSA 1.54 m²     ASA: 2  CHADS: 1.9%  RCRI: 0.4%  Ariscat: 1.6%  DASI Risk Score      Flowsheet Row Pre-Admission Testing from 3/28/2025 in [facility]   Can you take care of yourself (eat, dress, bathe, or use toilet)?  2.75 filed at 03/28/2025 0705   Can you walk indoors, such as around your house? 1.75 filed at 03/28/2025 0705   Can you walk a block or two on level ground?  2.75 filed at 03/28/2025 0705   Can you climb a flight of stairs or walk up a hill? 5.5 filed at 03/28/2025 0705   Can you run a short distance? 8 filed at 03/28/2025 0705   Can you do light work around the house like dusting or washing dishes? 2.7 filed at 03/28/2025 0705   Can you do moderate work around the house like vacuuming, sweeping floors or carrying groceries? 3.5 filed at 03/28/2025 0705   Can you do heavy work around the house like scrubbing floors or lifting and moving heavy furniture?  8 filed at 03/28/2025 0705   Can you do yard work like raking leaves, weeding or pushing a mower? 4.5 filed at 03/28/2025 0705   Can you have sexual relations? 5.25 filed at 03/28/2025 0705   Can you participate in moderate recreational activities like golf, bowling, dancing, doubles tennis or throwing a baseball or football? 6 filed at 03/28/2025 0705   Can you " participate in strenous sports like swimming, singles tennis, football, basketball, or skiing? 7.5 filed at 03/28/2025 0705   DASI SCORE 58.2 filed at 03/28/2025 0705   METS Score (Will be calculated only when all the questions are answered) 9.9 filed at 03/28/2025 0705          Caprini DVT Assessment    No data to display       Modified Frailty Index    No data to display       [id number] Stroke Risk Points  Current as of just now        N/A 0 to 9 Points:      Last Change: N/A          The [id number] risk score (Lip GH, et al. 2009. © 2010 American College [employer]) quantifies the risk of stroke for a patient with atrial fibrillation. For patients without atrial fibrillation or under the age of 18 this score appears as N/A. Higher score values generally indicate higher risk of stroke.        This score is not applicable to this patient. Components are not calculated.          Revised Cardiac Risk Index      Flowsheet Row Pre-Admission Testing from 3/28/2025 in [facility]   High-Risk Surgery (Intraperitoneal, Intrathoracic,Suprainguinal vascular) 0 filed at 03/28/2025 0716   History of ischemic heart disease (History of MI, History of positive exercuse test, Current chest paint considered due to myocardial ischemia, Use of nitrate therapy, ECG with pathological Q Waves) 0 filed at 03/28/2025 0716   History of congestive heart failure (pulmonary edemia, bilateral rales or S3 gallop, Paroxysmal nocturnal dyspnea, CXR showing pulmonary vascular redistribution) 0 filed at 03/28/2025 0716   History of cerebrovascular disease (Prior TIA or stroke) 0 filed at 03/28/2025 0716   Pre-operative insulin treatment 0 filed at 03/28/2025 0716   Pre-operative creatinine>2 mg/dl 0 filed at 03/28/2025 0716   Revised Cardiac Risk Calculator 0 filed at 03/28/2025 0716          Apfel Simplified Score    No data to display       Risk Analysis Index Results This Encounter    No data found in the last 10  encounters.       Stop Bang Score      Flowsheet Row Pre-Admission Testing from 3/28/2025 in [facility]   Do you snore loudly? 0 filed at 03/28/2025 0704   Do you often feel tired or fatigued after your sleep? 0 filed at 03/28/2025 0704   Has anyone ever observed you stop breathing in your sleep? 0 filed at 03/28/2025 0704   Do you have or are you being treated for high blood pressure? 0 filed at 03/28/2025 0704   Recent BMI (Calculated) 21.9 filed at 03/28/2025 0704   Is BMI greater than 35 kg/m2? 0=No filed at 03/28/2025 0704   Age older than 50 years old? 1=Yes filed at 03/28/2025 0704   Is your neck circumference greater than 17 inches (Male) or 16 inches (Female)? 0 filed at 03/28/2025 0704   Gender - Male 0=No filed at 03/28/2025 0704   STOP-BANG Total Score 1 filed at 03/28/2025 0704          Prodigy: High Risk  Total Score: 8              Prodigy Age Score           ARISCAT Score for Postoperative Pulmonary Complications    No data to display       Fried Perioperative Risk for Myocardial Infarction or Cardiac Arrest (HOSEA)    No data to display         Assessment and Plan:     Colon cancer screening: Colonoscopy  Rheumatoid arthritis: Pt is on Arava and Taltz. Pt is followed by Dr Zelaya.   Psoriatic arthritis: Pt is on Taltz injection every 28 days. Pt is followed by Dr Zelaya.   Osteoporosis: Pt takes ibandronate every 30 days.   Daily cigarette smoker    CBC completed on 2/14/2025  BMP collected in [facility] today.    Elzbieta Stern, APRN-CNP

## 2025-03-28 NOTE — PREPROCEDURE INSTRUCTIONS
Medication List            Accurate as of March 28, 2025  7:13 AM. Always use your most recent med list.                BIOTIN ORAL  Additional Medication Adjustments for Surgery: Take last dose 7 days before surgery     ibandronate 150 mg tablet  Commonly known as: Boniva  Take 1 tablet (150 mg) by mouth every 30 (thirty) days. Take in morning with full glass of water on an empty stomach. No food, drink, meds, or lying down for 60 minutes after.  Medication Adjustments for Surgery: Take/Use as prescribed  Additional Medication Adjustments for Surgery: Other (Comment)  Notes to patient: Last dose will be 3/30/25     leflunomide 20 mg tablet  Commonly known as: Arava  TAKE 1 TABLET BY MOUTH EVERY DAY  Medication Adjustments for Surgery: Do Not take on the morning of surgery     nabumetone 750 mg tablet  Commonly known as: Relafen  TAKE 1 TABLET BY MOUTH 2 TIMES A DAY AS NEEDED FOR MILD PAIN (1 - 3) OR MODERATE PAIN (4 - 6).  Additional Medication Adjustments for Surgery: Take last dose 7 days before surgery     Taltz Autoinjector 80 mg/mL injection  Generic drug: ixekizumab  Medication Adjustments for Surgery: Take/Use as prescribed  Additional Medication Adjustments for Surgery: Other (Comment)  Notes to patient: Last dose will be 3/30/25                PAT DISCHARGE INSTRUCTIONS    Please call the Same Day Surgery (SDS) Department of the hospital where your procedure will be performed after 2:00 PM the day before your surgery. If you are scheduled on a Monday, or a Tuesday following a Monday holiday, you will need to call on the last business day prior to your surgery.    [facility]  [address] [place] [address] [phone]  [address], [address] [phone]  [facility]  [address] [place]  [address]  [phone]    Please let your surgeon know if:      You develop any open sores, shingles, burning or painful urination as these may increase your risk of an infection.   You no longer wish to have the surgery.   Any other personal circumstances change that may lead to the need to cancel or defer this surgery-such as being sick or getting admitted to any hospital within one week of your planned procedure.    Your contact details change, such as a change of address or phone number.    Starting now:     Please DO NOT drink alcohol or smoke for 24 hours before surgery. It is well known that quitting smoking can make a huge difference to your health and recovery from surgery. The longer you abstain from smoking, the better your chances of a healthy recovery. If you need help with quitting, call 1-800-QUIT-NOW to be connected to a trained counselor who will discuss the best methods to help you quit.     Before your surgery:    Please stop all supplements 7 days prior to surgery. Or as directed by your surgeon.   Please stop taking NSAID pain medicine such as Advil and Motrin 7 days before surgery.    If you develop any fever, cough, cold, rashes, cuts, scratches, scrapes, urinary symptoms or infection anywhere on your body (including teeth and gums) prior to surgery, please call your surgeon’s office as soon as possible. This may require treatment to reduce the chance of cancellation on the day of surgery.      On the morning of surgery:   Wear comfortable, loose fitting clothes which open in the front. Please do not wear moisturizers, creams, lotions, makeup or perfume.    Please do NOT bring with you to surgery:   All jewelry and valuables should be left at home.   Prosthetic devices such as contact lenses, hearing aids, dentures, eyelash extensions, hairpins and body piercings must be removed prior to going in to the surgical suite.    Follow your instructions for your  colonoscopy

## 2025-03-28 NOTE — CPM/PAT H&P
CPM/PAT Evaluation       Name: Ammy Mar (Ammy Mar)  /Age: [birth year]64 y.o.     In-Person       Chief Complaint: H/O colon polyps    HPI    Pt is a 64 year old female who is scheduled for a colon cancer screening. Pt reports she has a history of colon polyps and her last colonoscopy was 5 years ago. Pt denies nausea, vomiting, abdominal pain, diarrhea, constipation, melena , or hematochezia. Pt was scheduled for a colonoscopy. Pt denies CP, SOB, or dizziness.     Past Medical History:   Diagnosis Date    Osteoporosis     Psoriasis     Psoriatic arthritis (Multi)     RA (rheumatoid arthritis)      Past Surgical History:   Procedure Laterality Date    BACK SURGERY      x3    CARPAL TUNNEL RELEASE Right     COLONOSCOPY W/ POLYPECTOMY  2017    3 polyps removed, largest polyp removed 10mm    COLONOSCOPY W/ POLYPECTOMY  2020    with Dr. Boudreaux    HYSTERECTOMY       Social History     Tobacco Use    Smoking status: Every Day     Current packs/day: 0.75     Average packs/day: 0.8 packs/day for 33.0 years (24.8 ttl pk-yrs)     Types: Cigarettes     Passive exposure: Current    Smokeless tobacco: Never   Substance Use Topics    Alcohol use: Yes     Comment: rarely     Social History     Substance and Sexual Activity   Drug Use Never       Patient Sexual activity questions deferred to the physician.    Family History   Problem Relation Name Age of Onset    Diabetes Mother      Heart disease Mother      Hypertension Mother      Bone cancer Father      Other (multiple health issues) Sister      Prostate cancer Brother       Allergies   Allergen Reactions    Ciprofloxacin Hives and Unknown    Erythromycin Nausea/vomiting and Unknown    Macrolide Antibiotics GI Upset and Nausea/vomiting     Other reaction(s): Vomiting    Penicillins Hives and Unknown    Sulfa (Sulfonamide Antibiotics) GI Upset, Other and Nausea And Vomiting     Current Outpatient Medications   Medication Sig Dispense Refill    BIOTIN  ORAL Take by mouth once daily.      ibandronate (Boniva) 150 mg tablet Take 1 tablet (150 mg) by mouth every 30 (thirty) days. Take in morning with full glass of water on an empty stomach. No food, drink, meds, or lying down for 60 minutes after. (Patient not taking: Reported on 2/25/2025) 3 tablet 0    leflunomide (Arava) 20 mg tablet TAKE 1 TABLET BY MOUTH EVERY DAY 90 tablet 0    nabumetone (Relafen) 750 mg tablet TAKE 1 TABLET BY MOUTH 2 TIMES A DAY AS NEEDED FOR MILD PAIN (1 - 3) OR MODERATE PAIN (4 - 6). 60 tablet 5    Taltz Autoinjector 80 mg/mL injection Inject under the skin every 28 (twenty-eight) days.       No current facility-administered medications for this visit.     Review of Systems   Constitutional: Negative.    HENT: Negative.     Eyes: Negative.    Respiratory: Negative.     Cardiovascular: Negative.    Gastrointestinal: Negative.    Endocrine: Negative.    Genitourinary: Negative.    Musculoskeletal: Negative.    Skin: Negative.    Neurological: Negative.    Hematological: Negative.    Psychiatric/Behavioral: Negative.       Physical Exam  Vitals reviewed.   Constitutional:       Appearance: Normal appearance.   HENT:      Head: Normocephalic and atraumatic.      Nose: Nose normal.      Mouth/Throat:      Mouth: Mucous membranes are moist.      Pharynx: Oropharynx is clear.   Eyes:      Extraocular Movements: Extraocular movements intact.      Conjunctiva/sclera: Conjunctivae normal.      Pupils: Pupils are equal, round, and reactive to light.   Cardiovascular:      Rate and Rhythm: Normal rate and regular rhythm.      Pulses: Normal pulses.      Heart sounds: Normal heart sounds.   Pulmonary:      Effort: Pulmonary effort is normal.      Breath sounds: Normal breath sounds.   Abdominal:      General: Bowel sounds are normal.      Palpations: Abdomen is soft.   Musculoskeletal:         General: Normal range of motion.      Cervical back: Normal range of motion and neck supple.   Skin:      "General: Skin is warm and dry.   Neurological:      General: No focal deficit present.      Mental Status: She is alert and oriented to person, place, and time. Mental status is at baseline.   Psychiatric:         Mood and Affect: Mood normal.         Behavior: Behavior normal.         Thought Content: Thought content normal.         Judgment: Judgment normal.          PAT AIRWAY:   Airway:     Mallampati::  II    TM distance::  >3 FB    Neck ROM::  Full  normal      Visit Vitals  /80   Pulse 85   Temp 36.2 °C (97.2 °F) (Temporal)   Resp 16   Ht 1.575 m (5' 2\")   Wt 54.4 kg (120 lb)   SpO2 98%   BMI 21.95 kg/m²   OB Status Hysterectomy   Smoking Status Every Day   BSA 1.54 m²     ASA: 2  CHADS: 1.9%  RCRI: 0.4%  Ariscat: 1.6%  DASI Risk Score      Flowsheet Row Pre-Admission Testing from 3/28/2025 in [facility]   Can you take care of yourself (eat, dress, bathe, or use toilet)?  2.75 filed at 03/28/2025 0705   Can you walk indoors, such as around your house? 1.75 filed at 03/28/2025 0705   Can you walk a block or two on level ground?  2.75 filed at 03/28/2025 0705   Can you climb a flight of stairs or walk up a hill? 5.5 filed at 03/28/2025 0705   Can you run a short distance? 8 filed at 03/28/2025 0705   Can you do light work around the house like dusting or washing dishes? 2.7 filed at 03/28/2025 0705   Can you do moderate work around the house like vacuuming, sweeping floors or carrying groceries? 3.5 filed at 03/28/2025 0705   Can you do heavy work around the house like scrubbing floors or lifting and moving heavy furniture?  8 filed at 03/28/2025 0705   Can you do yard work like raking leaves, weeding or pushing a mower? 4.5 filed at 03/28/2025 0705   Can you have sexual relations? 5.25 filed at 03/28/2025 0705   Can you participate in moderate recreational activities like golf, bowling, dancing, doubles tennis or throwing a baseball or football? 6 filed at 03/28/2025 0705   Can you " participate in strenous sports like swimming, singles tennis, football, basketball, or skiing? 7.5 filed at 03/28/2025 0705   DASI SCORE 58.2 filed at 03/28/2025 0705   METS Score (Will be calculated only when all the questions are answered) 9.9 filed at 03/28/2025 0705          Caprini DVT Assessment    No data to display       Modified Frailty Index    No data to display       [id number] Stroke Risk Points  Current as of just now        N/A 0 to 9 Points:      Last Change: N/A          The [id number] risk score (Lip GH, et al. 2009. © 2010 American College [employer] Chest [employer]icians) quantifies the risk of stroke for a patient with atrial fibrillation. For patients without atrial fibrillation or under the age of 18 this score appears as N/A. Higher score values generally indicate higher risk of stroke.        This score is not applicable to this patient. Components are not calculated.          Revised Cardiac Risk Index      Flowsheet Row Pre-Admission Testing from 3/28/2025 in [facility]   High-Risk Surgery (Intraperitoneal, Intrathoracic,Suprainguinal vascular) 0 filed at 03/28/2025 0716   History of ischemic heart disease (History of MI, History of positive exercuse test, Current chest paint considered due to myocardial ischemia, Use of nitrate therapy, ECG with pathological Q Waves) 0 filed at 03/28/2025 0716   History of congestive heart failure (pulmonary edemia, bilateral rales or S3 gallop, Paroxysmal nocturnal dyspnea, CXR showing pulmonary vascular redistribution) 0 filed at 03/28/2025 0716   History of cerebrovascular disease (Prior TIA or stroke) 0 filed at 03/28/2025 0716   Pre-operative insulin treatment 0 filed at 03/28/2025 0716   Pre-operative creatinine>2 mg/dl 0 filed at 03/28/2025 0716   Revised Cardiac Risk Calculator 0 filed at 03/28/2025 0716          Apfel Simplified Score    No data to display       Risk Analysis Index Results This Encounter    No data found in the last 10  encounters.       Stop Bang Score      Flowsheet Row Pre-Admission Testing from 3/28/2025 in [facility]   Do you snore loudly? 0 filed at 03/28/2025 0704   Do you often feel tired or fatigued after your sleep? 0 filed at 03/28/2025 0704   Has anyone ever observed you stop breathing in your sleep? 0 filed at 03/28/2025 0704   Do you have or are you being treated for high blood pressure? 0 filed at 03/28/2025 0704   Recent BMI (Calculated) 21.9 filed at 03/28/2025 0704   Is BMI greater than 35 kg/m2? 0=No filed at 03/28/2025 0704   Age older than 50 years old? 1=Yes filed at 03/28/2025 0704   Is your neck circumference greater than 17 inches (Male) or 16 inches (Female)? 0 filed at 03/28/2025 0704   Gender - Male 0=No filed at 03/28/2025 0704   STOP-BANG Total Score 1 filed at 03/28/2025 0704          Prodigy: High Risk  Total Score: 8              Prodigy Age Score           ARISCAT Score for Postoperative Pulmonary Complications    No data to display       Fried Perioperative Risk for Myocardial Infarction or Cardiac Arrest (HOSEA)    No data to display         Assessment and Plan:     Colon cancer screening: Colonoscopy  Rheumatoid arthritis: Pt is on Arava and Taltz. Pt is followed by Dr Zelaya.   Psoriatic arthritis: Pt is on Taltz injection every 28 days. Pt is followed by Dr Zelaya.   Osteoporosis: Pt takes ibandronate every 30 days.   Daily cigarette smoker    CBC completed on 2/14/2025  BMP collected in [facility] today.    Elzbieta Stern, APRN-CNP

## 2025-04-08 ENCOUNTER — HOSPITAL ENCOUNTER (OUTPATIENT)
Dept: GASTROENTEROLOGY | Facility: HOSPITAL | Age: 65
Discharge: HOME | End: 2025-04-08
Payer: COMMERCIAL

## 2025-04-08 ENCOUNTER — ANESTHESIA (OUTPATIENT)
Dept: GASTROENTEROLOGY | Facility: HOSPITAL | Age: 65
End: 2025-04-08
Payer: COMMERCIAL

## 2025-04-08 ENCOUNTER — ANESTHESIA EVENT (OUTPATIENT)
Dept: GASTROENTEROLOGY | Facility: HOSPITAL | Age: 65
End: 2025-04-08
Payer: COMMERCIAL

## 2025-04-08 VITALS
DIASTOLIC BLOOD PRESSURE: 72 MMHG | OXYGEN SATURATION: 97 % | WEIGHT: 120 LBS | TEMPERATURE: 97 F | BODY MASS INDEX: 22.08 KG/M2 | RESPIRATION RATE: 15 BRPM | SYSTOLIC BLOOD PRESSURE: 128 MMHG | HEART RATE: 70 BPM | HEIGHT: 62 IN

## 2025-04-08 DIAGNOSIS — Z12.11 SPECIAL SCREENING FOR MALIGNANT NEOPLASMS, COLON: ICD-10-CM

## 2025-04-08 PROCEDURE — 7100000002 HC RECOVERY ROOM TIME - EACH INCREMENTAL 1 MINUTE

## 2025-04-08 PROCEDURE — 7100000010 HC PHASE TWO TIME - EACH INCREMENTAL 1 MINUTE

## 2025-04-08 PROCEDURE — 2500000004 HC RX 250 GENERAL PHARMACY W/ HCPCS (ALT 636 FOR OP/ED): Performed by: ANESTHESIOLOGIST ASSISTANT

## 2025-04-08 PROCEDURE — 3700000002 HC GENERAL ANESTHESIA TIME - EACH INCREMENTAL 1 MINUTE

## 2025-04-08 PROCEDURE — 3700000001 HC GENERAL ANESTHESIA TIME - INITIAL BASE CHARGE

## 2025-04-08 PROCEDURE — 7100000001 HC RECOVERY ROOM TIME - INITIAL BASE CHARGE

## 2025-04-08 PROCEDURE — 7100000009 HC PHASE TWO TIME - INITIAL BASE CHARGE

## 2025-04-08 RX ORDER — ONDANSETRON HYDROCHLORIDE 2 MG/ML
4 INJECTION, SOLUTION INTRAVENOUS ONCE AS NEEDED
Status: DISCONTINUED | OUTPATIENT
Start: 2025-04-08 | End: 2025-04-09 | Stop reason: HOSPADM

## 2025-04-08 RX ORDER — HYDROMORPHONE HYDROCHLORIDE 0.2 MG/ML
0.2 INJECTION INTRAMUSCULAR; INTRAVENOUS; SUBCUTANEOUS EVERY 5 MIN PRN
Status: DISCONTINUED | OUTPATIENT
Start: 2025-04-08 | End: 2025-04-09 | Stop reason: HOSPADM

## 2025-04-08 RX ORDER — PROPOFOL 10 MG/ML
INJECTION, EMULSION INTRAVENOUS AS NEEDED
Status: DISCONTINUED | OUTPATIENT
Start: 2025-04-08 | End: 2025-04-08

## 2025-04-08 RX ORDER — MIDAZOLAM HYDROCHLORIDE 1 MG/ML
INJECTION, SOLUTION INTRAMUSCULAR; INTRAVENOUS AS NEEDED
Status: DISCONTINUED | OUTPATIENT
Start: 2025-04-08 | End: 2025-04-08

## 2025-04-08 RX ORDER — ALBUTEROL SULFATE 0.83 MG/ML
2.5 SOLUTION RESPIRATORY (INHALATION) ONCE AS NEEDED
Status: DISCONTINUED | OUTPATIENT
Start: 2025-04-08 | End: 2025-04-09 | Stop reason: HOSPADM

## 2025-04-08 RX ORDER — LIDOCAINE HYDROCHLORIDE 10 MG/ML
0.1 INJECTION, SOLUTION INFILTRATION; PERINEURAL ONCE
Status: DISCONTINUED | OUTPATIENT
Start: 2025-04-08 | End: 2025-04-09 | Stop reason: HOSPADM

## 2025-04-08 RX ORDER — SODIUM CHLORIDE, SODIUM LACTATE, POTASSIUM CHLORIDE, CALCIUM CHLORIDE 600; 310; 30; 20 MG/100ML; MG/100ML; MG/100ML; MG/100ML
100 INJECTION, SOLUTION INTRAVENOUS CONTINUOUS
Status: DISCONTINUED | OUTPATIENT
Start: 2025-04-08 | End: 2025-04-09 | Stop reason: HOSPADM

## 2025-04-08 RX ORDER — SODIUM CHLORIDE, SODIUM LACTATE, POTASSIUM CHLORIDE, CALCIUM CHLORIDE 600; 310; 30; 20 MG/100ML; MG/100ML; MG/100ML; MG/100ML
INJECTION, SOLUTION INTRAVENOUS CONTINUOUS PRN
Status: DISCONTINUED | OUTPATIENT
Start: 2025-04-08 | End: 2025-04-08

## 2025-04-08 RX ORDER — HYDROMORPHONE HYDROCHLORIDE 0.2 MG/ML
0.1 INJECTION INTRAMUSCULAR; INTRAVENOUS; SUBCUTANEOUS EVERY 5 MIN PRN
Status: DISCONTINUED | OUTPATIENT
Start: 2025-04-08 | End: 2025-04-09 | Stop reason: HOSPADM

## 2025-04-08 RX ORDER — HYDRALAZINE HYDROCHLORIDE 20 MG/ML
5 INJECTION INTRAMUSCULAR; INTRAVENOUS EVERY 30 MIN PRN
Status: DISCONTINUED | OUTPATIENT
Start: 2025-04-08 | End: 2025-04-09 | Stop reason: HOSPADM

## 2025-04-08 RX ADMIN — PROPOFOL 150 MG: 10 INJECTION, EMULSION INTRAVENOUS at 09:13

## 2025-04-08 RX ADMIN — MIDAZOLAM 2 MG: 1 INJECTION INTRAMUSCULAR; INTRAVENOUS at 09:11

## 2025-04-08 RX ADMIN — PROPOFOL 50 MG: 10 INJECTION, EMULSION INTRAVENOUS at 09:11

## 2025-04-08 RX ADMIN — SODIUM CHLORIDE, POTASSIUM CHLORIDE, SODIUM LACTATE AND CALCIUM CHLORIDE: 600; 310; 30; 20 INJECTION, SOLUTION INTRAVENOUS at 09:06

## 2025-04-08 SDOH — HEALTH STABILITY: MENTAL HEALTH: CURRENT SMOKER: 0

## 2025-04-08 ASSESSMENT — COLUMBIA-SUICIDE SEVERITY RATING SCALE - C-SSRS
1. IN THE PAST MONTH, HAVE YOU WISHED YOU WERE DEAD OR WISHED YOU COULD GO TO SLEEP AND NOT WAKE UP?: NO
6. HAVE YOU EVER DONE ANYTHING, STARTED TO DO ANYTHING, OR PREPARED TO DO ANYTHING TO END YOUR LIFE?: NO
2. HAVE YOU ACTUALLY HAD ANY THOUGHTS OF KILLING YOURSELF?: NO

## 2025-04-08 ASSESSMENT — PAIN - FUNCTIONAL ASSESSMENT
PAIN_FUNCTIONAL_ASSESSMENT: 0-10

## 2025-04-08 ASSESSMENT — PAIN SCALES - GENERAL
PAINLEVEL_OUTOF10: 0 - NO PAIN

## 2025-04-08 NOTE — POST-PROCEDURE NOTE
PATIENT BROUGHT OVER FROM PACU.  SHE IS ALERT AND AWAKE.  DENIES ANY PAIN.  SPOUSE AT THE BEDSIDE.  TOLERATED COFFEE AND COOKIES.  DISCHARGE INSTRUCTIONS REVIEWED, VERBALIZED UNDERSTANDING.

## 2025-04-08 NOTE — ANESTHESIA PREPROCEDURE EVALUATION
Patient: Ammy Mar    Procedure Information       Date/Time: 04/08/25 0900    Scheduled providers: Janice Boudreaux MD; Khalif King MD; SWATI Melendrez    Procedure: COLONOSCOPY    Location: [facility]          Past Medical History:   Diagnosis Date    Osteoporosis     Psoriasis     Psoriatic arthritis (Multi)     RA (rheumatoid arthritis)         Relevant Problems   Cardiac   (+) Pure hypercholesterolemia     Past Surgical History:   Procedure Laterality Date    BACK SURGERY      x3    CARPAL TUNNEL RELEASE Right     COLONOSCOPY W/ POLYPECTOMY  04/06/2017    3 polyps removed, largest polyp removed 10mm    COLONOSCOPY W/ POLYPECTOMY  07/07/2020    with Dr. Boudreaux    HYSTERECTOMY        Clinical information reviewed:   Tobacco  Allergies  Meds   Med Hx  Surg Hx  OB Status  Fam Hx  Soc   Hx        NPO Detail:  NPO/Void Status  Carbohydrate Drink Given Prior to Surgery? : N  Date of Last Liquid: 04/08/25  Time of Last Liquid: 0130  Date of Last Solid: 04/06/25  Time of Last Solid: 1830  Last Intake Type: Clear fluids  Time of Last Void: 0740         Physical Exam    Airway  Mallampati: II  TM distance: >3 FB  Neck ROM: full     Cardiovascular    Dental    Pulmonary    Abdominal            Anesthesia Plan    History of general anesthesia?: yes  History of complications of general anesthesia?: no    ASA 2     MAC     The patient is not a current smoker.  Patient was not previously instructed to abstain from smoking on day of procedure.  Patient did not smoke on day of procedure.    intravenous induction   Postoperative administration of opioids is intended.  Anesthetic plan and risks discussed with patient.    Plan discussed with attending.

## 2025-04-08 NOTE — ANESTHESIA POSTPROCEDURE EVALUATION
Patient: Ammy Mar    Procedure Summary       Date: 04/08/25 Room / Location: [facility]    Anesthesia Start: 0906 Anesthesia Stop: 0936    Procedure: COLONOSCOPY Diagnosis: Special screening for malignant neoplasms, colon    Scheduled Providers: Janice Boudreaux MD; Khalif King MD; SWATI Melendrez Responsible Provider: Khalif King MD    Anesthesia Type: MAC ASA Status: 2            Anesthesia Type: MAC    Vitals Value Taken Time   /72 04/08/25 0940   Temp 36.1 °C (97 °F) 04/08/25 0935   Pulse 77 04/08/25 0940   Resp 22 04/08/25 0940   SpO2 99 % 04/08/25 0940       Anesthesia Post Evaluation    Patient location during evaluation: PACU  Patient participation: complete - patient participated  Level of consciousness: awake  Pain management: adequate  Airway patency: patent  Cardiovascular status: acceptable  Respiratory status: acceptable  Hydration status: acceptable  Postoperative Nausea and Vomiting: none        There were no known notable events for this encounter.

## 2025-04-11 LAB
LABORATORY COMMENT REPORT: NORMAL
PATH REPORT.FINAL DX SPEC: NORMAL
PATH REPORT.GROSS SPEC: NORMAL
PATH REPORT.RELEVANT HX SPEC: NORMAL
PATH REPORT.TOTAL CANCER: NORMAL

## 2025-04-21 DIAGNOSIS — M06.9 RHEUMATOID ARTHRITIS, INVOLVING UNSPECIFIED SITE, UNSPECIFIED WHETHER RHEUMATOID FACTOR PRESENT (MULTI): ICD-10-CM

## 2025-04-22 ENCOUNTER — APPOINTMENT (OUTPATIENT)
Dept: SURGERY | Facility: CLINIC | Age: 65
End: 2025-04-22
Payer: COMMERCIAL

## 2025-04-22 RX ORDER — NABUMETONE 750 MG/1
TABLET, FILM COATED ORAL
Qty: 60 TABLET | Refills: 2 | Status: SHIPPED | OUTPATIENT
Start: 2025-04-22

## 2025-05-05 ENCOUNTER — TELEPHONE (OUTPATIENT)
Dept: PRIMARY CARE | Facility: CLINIC | Age: 65
End: 2025-05-05
Payer: COMMERCIAL

## 2025-05-05 DIAGNOSIS — Z86.2 HISTORY OF ANEMIA: ICD-10-CM

## 2025-05-05 DIAGNOSIS — E55.9 VITAMIN D DEFICIENCY: ICD-10-CM

## 2025-05-05 DIAGNOSIS — E78.2 MIXED HYPERLIPIDEMIA: Primary | ICD-10-CM

## 2025-05-05 DIAGNOSIS — Z78.0 MENOPAUSE: ICD-10-CM

## 2025-05-05 DIAGNOSIS — Z13.820 ENCOUNTER FOR SCREENING FOR OSTEOPOROSIS: ICD-10-CM

## 2025-05-05 DIAGNOSIS — R73.9 HYPERGLYCEMIA: ICD-10-CM

## 2025-05-05 NOTE — TELEPHONE ENCOUNTER
Pt has CPE on 5/19/25 and asking if you want her to get labs drawn.  There is no order in Epic.  Please advise.  Ph: [phone]

## 2025-05-07 PROBLEM — E55.9 VITAMIN D DEFICIENCY: Status: RESOLVED | Noted: 2024-02-15 | Resolved: 2025-05-07

## 2025-05-07 RX ORDER — DOXYCYCLINE HYCLATE 20 MG
1 TABLET ORAL
COMMUNITY
Start: 2024-12-20 | End: 2025-05-14 | Stop reason: WASHOUT

## 2025-05-13 ENCOUNTER — APPOINTMENT (OUTPATIENT)
Dept: RADIOLOGY | Facility: HOSPITAL | Age: 65
End: 2025-05-13
Payer: COMMERCIAL

## 2025-05-13 DIAGNOSIS — Z13.820 ENCOUNTER FOR SCREENING FOR OSTEOPOROSIS: ICD-10-CM

## 2025-05-13 DIAGNOSIS — Z78.0 MENOPAUSE: ICD-10-CM

## 2025-05-13 PROCEDURE — 77080 DXA BONE DENSITY AXIAL: CPT

## 2025-05-13 PROCEDURE — 77080 DXA BONE DENSITY AXIAL: CPT | Performed by: RADIOLOGY

## 2025-05-13 ASSESSMENT — PROMIS GLOBAL HEALTH SCALE
EMOTIONAL_PROBLEMS: NEVER
CARRYOUT_SOCIAL_ACTIVITIES: EXCELLENT
RATE_GENERAL_HEALTH: VERY GOOD
RATE_AVERAGE_PAIN: 2
RATE_SOCIAL_SATISFACTION: EXCELLENT
RATE_MENTAL_HEALTH: EXCELLENT
RATE_AVERAGE_FATIGUE: MILD
RATE_QUALITY_OF_LIFE: VERY GOOD
CARRYOUT_PHYSICAL_ACTIVITIES: COMPLETELY
RATE_PHYSICAL_HEALTH: VERY GOOD

## 2025-05-14 DIAGNOSIS — M81.0 AGE-RELATED OSTEOPOROSIS WITHOUT CURRENT PATHOLOGICAL FRACTURE: ICD-10-CM

## 2025-05-14 RX ORDER — IBANDRONATE SODIUM 150 MG/1
150 TABLET, FILM COATED ORAL
Qty: 3 TABLET | Refills: 0 | Status: SHIPPED | OUTPATIENT
Start: 2025-05-14

## 2025-05-17 ENCOUNTER — APPOINTMENT (OUTPATIENT)
Dept: RADIOLOGY | Facility: HOSPITAL | Age: 65
End: 2025-05-17
Payer: COMMERCIAL

## 2025-05-17 LAB
25(OH)D3+25(OH)D2 SERPL-MCNC: 41 NG/ML (ref 30–100)
ALBUMIN SERPL-MCNC: 4.4 G/DL (ref 3.6–5.1)
ALP SERPL-CCNC: 38 U/L (ref 37–153)
ALT SERPL-CCNC: 10 U/L (ref 6–29)
ANION GAP SERPL CALCULATED.4IONS-SCNC: 8 MMOL/L (CALC) (ref 7–17)
AST SERPL-CCNC: 14 U/L (ref 10–35)
BASOPHILS # BLD AUTO: 60 CELLS/UL (ref 0–200)
BASOPHILS NFR BLD AUTO: 1.3 %
BILIRUB SERPL-MCNC: 0.5 MG/DL (ref 0.2–1.2)
BUN SERPL-MCNC: 12 MG/DL (ref 7–25)
CALCIUM SERPL-MCNC: 9.1 MG/DL (ref 8.6–10.4)
CHLORIDE SERPL-SCNC: 106 MMOL/L (ref 98–110)
CHOLEST SERPL-MCNC: 249 MG/DL
CHOLEST/HDLC SERPL: 4.4 (CALC)
CO2 SERPL-SCNC: 27 MMOL/L (ref 20–32)
CREAT SERPL-MCNC: 0.72 MG/DL (ref 0.5–1.05)
EGFRCR SERPLBLD CKD-EPI 2021: 93 ML/MIN/1.73M2
EOSINOPHIL # BLD AUTO: 299 CELLS/UL (ref 15–500)
EOSINOPHIL NFR BLD AUTO: 6.5 %
ERYTHROCYTE [DISTWIDTH] IN BLOOD BY AUTOMATED COUNT: 12.5 % (ref 11–15)
EST. AVERAGE GLUCOSE BLD GHB EST-MCNC: 105 MG/DL
EST. AVERAGE GLUCOSE BLD GHB EST-SCNC: 5.8 MMOL/L
GLUCOSE SERPL-MCNC: 86 MG/DL (ref 65–99)
HBA1C MFR BLD: 5.3 %
HCT VFR BLD AUTO: 46.3 % (ref 35–45)
HDLC SERPL-MCNC: 57 MG/DL
HGB BLD-MCNC: 14.9 G/DL (ref 11.7–15.5)
LDLC SERPL CALC-MCNC: 170 MG/DL (CALC)
LYMPHOCYTES # BLD AUTO: 2314 CELLS/UL (ref 850–3900)
LYMPHOCYTES NFR BLD AUTO: 50.3 %
MCH RBC QN AUTO: 31.7 PG (ref 27–33)
MCHC RBC AUTO-ENTMCNC: 32.2 G/DL (ref 32–36)
MCV RBC AUTO: 98.5 FL (ref 80–100)
MONOCYTES # BLD AUTO: 446 CELLS/UL (ref 200–950)
MONOCYTES NFR BLD AUTO: 9.7 %
NEUTROPHILS # BLD AUTO: 1481 CELLS/UL (ref 1500–7800)
NEUTROPHILS NFR BLD AUTO: 32.2 %
NONHDLC SERPL-MCNC: 192 MG/DL (CALC)
PLATELET # BLD AUTO: 238 THOUSAND/UL (ref 140–400)
PMV BLD REES-ECKER: 9.4 FL (ref 7.5–12.5)
POTASSIUM SERPL-SCNC: 4.3 MMOL/L (ref 3.5–5.3)
PROT SERPL-MCNC: 6.4 G/DL (ref 6.1–8.1)
RBC # BLD AUTO: 4.7 MILLION/UL (ref 3.8–5.1)
SODIUM SERPL-SCNC: 141 MMOL/L (ref 135–146)
TRIGL SERPL-MCNC: 102 MG/DL
TSH SERPL-ACNC: 2.07 MIU/L (ref 0.4–4.5)
WBC # BLD AUTO: 4.6 THOUSAND/UL (ref 3.8–10.8)

## 2025-05-19 ENCOUNTER — OFFICE VISIT (OUTPATIENT)
Dept: PRIMARY CARE | Facility: CLINIC | Age: 65
End: 2025-05-19
Payer: COMMERCIAL

## 2025-05-19 VITALS
HEIGHT: 62 IN | OXYGEN SATURATION: 96 % | SYSTOLIC BLOOD PRESSURE: 130 MMHG | TEMPERATURE: 97.8 F | DIASTOLIC BLOOD PRESSURE: 81 MMHG | WEIGHT: 121 LBS | BODY MASS INDEX: 22.26 KG/M2 | HEART RATE: 80 BPM

## 2025-05-19 DIAGNOSIS — Z00.00 ENCOUNTER FOR WELLNESS EXAMINATION: Primary | ICD-10-CM

## 2025-05-19 DIAGNOSIS — E78.00 PURE HYPERCHOLESTEROLEMIA: ICD-10-CM

## 2025-05-19 DIAGNOSIS — M81.0 AGE-RELATED OSTEOPOROSIS WITHOUT CURRENT PATHOLOGICAL FRACTURE: ICD-10-CM

## 2025-05-19 PROCEDURE — 1159F MED LIST DOCD IN RCRD: CPT | Performed by: INTERNAL MEDICINE

## 2025-05-19 PROCEDURE — 3008F BODY MASS INDEX DOCD: CPT | Performed by: INTERNAL MEDICINE

## 2025-05-19 PROCEDURE — 1125F AMNT PAIN NOTED PAIN PRSNT: CPT | Performed by: INTERNAL MEDICINE

## 2025-05-19 PROCEDURE — 99397 PER PM REEVAL EST PAT 65+ YR: CPT | Performed by: INTERNAL MEDICINE

## 2025-05-19 RX ORDER — IBANDRONATE SODIUM 150 MG/1
150 TABLET, FILM COATED ORAL
Qty: 3 TABLET | Refills: 0 | Status: SHIPPED | OUTPATIENT
Start: 2025-05-19

## 2025-05-19 RX ORDER — ROSUVASTATIN CALCIUM 5 MG/1
5 TABLET, COATED ORAL DAILY
Qty: 90 TABLET | Refills: 0 | Status: SHIPPED | OUTPATIENT
Start: 2025-05-19

## 2025-05-19 ASSESSMENT — PAIN SCALES - GENERAL: PAINLEVEL_OUTOF10: 2

## 2025-05-19 NOTE — PROGRESS NOTES
[facility]: [facility]  PHYSICAL EXAM      Ammy Mar is a 65 y.o. female that is presenting today for Annual Exam.    Assessment/Plan   Diagnoses and all orders for this visit:  Encounter for wellness examination  Age-related osteoporosis without current pathological fracture  -     ibandronate (Boniva) 150 mg tablet; Take 1 tablet (150 mg) by mouth every 30 (thirty) days. Take in morning with full glass of water on an empty stomach. No food, drink, meds, or lying down for 60 minutes after.  -     rosuvastatin (Crestor) 5 mg tablet; Take 1 tablet (5 mg) by mouth once daily.  Pure hypercholesterolemia  -     Lipid panel; Future  -     AST; Future  -     ALT; Future  Other orders  -     Follow Up In Primary Care - Health Maintenance  -     Follow Up In Primary Care; Future  Subjective   HPI    - Ammy Mar 65 y.o. female is here today for her CPE/ FBW/ Refills.         - Patient denies any symptoms or concerns at this time.       - patient denies any adverse reactions to or concerns with his/her meds.       - Problem list and medication reconciliation done today.  - V.S. Stable. No changes at this time.  - Encouraged continued diet and exercise modification.     Review of Systems   All pertinent POSITIVES as noted per HPI.  All other systems have been reviewed and are NEGATIVE and /or Noncontributory to this patient current visit or complaint.     Objective   Vitals:    05/19/25 1703   BP: 130/81   Pulse: 80   Temp: 36.6 °C (97.8 °F)   SpO2: 96%     Body mass index is 22.13 kg/m².  Physical Exam  Vitals and nursing note reviewed.   Constitutional:       Appearance: Normal appearance.   HENT:      Head: Normocephalic and atraumatic.      Right Ear: Tympanic membrane, ear canal and external ear normal.      Left Ear: Tympanic membrane, ear canal and external ear normal.      Nose: Nose normal.      Mouth/Throat:      Mouth: Mucous membranes are moist.      Pharynx: Oropharynx is clear.    Eyes:      Extraocular Movements: Extraocular movements intact.      Conjunctiva/sclera: Conjunctivae normal.      Pupils: Pupils are equal, round, and reactive to light.   Neck:      Vascular: No carotid bruit.   Cardiovascular:      Rate and Rhythm: Normal rate and regular rhythm.      Pulses: Normal pulses.      Heart sounds: Normal heart sounds.   Pulmonary:      Effort: Pulmonary effort is normal.      Breath sounds: Normal breath sounds.   Abdominal:      General: Abdomen is flat. Bowel sounds are normal.      Palpations: Abdomen is soft.   Musculoskeletal:         General: No swelling. Normal range of motion.      Cervical back: Normal range of motion and neck supple.   Lymphadenopathy:      Cervical: No cervical adenopathy.   Skin:     General: Skin is warm and dry.   Neurological:      General: No focal deficit present.      Mental Status: She is alert and oriented to person, place, and time. Mental status is at baseline.   Psychiatric:         Mood and Affect: Mood normal.         Behavior: Behavior normal.       Diagnostic Results   Lab Results   Component Value Date    GLUCOSE 86 05/16/2025    CALCIUM 9.1 05/16/2025     05/16/2025    K 4.3 05/16/2025    CO2 27 05/16/2025     05/16/2025    BUN 12 05/16/2025    CREATININE 0.72 05/16/2025     Lab Results   Component Value Date    ALT 10 05/16/2025    AST 14 05/16/2025    ALKPHOS 38 05/16/2025    BILITOT 0.5 05/16/2025     Lab Results   Component Value Date    WBC 4.6 05/16/2025    HGB 14.9 05/16/2025    HCT 46.3 (H) 05/16/2025    MCV 98.5 05/16/2025     05/16/2025     Lab Results   Component Value Date    CHOL 249 (H) 05/16/2025    CHOL 220 (H) 05/08/2024    CHOL 183 01/18/2023     Lab Results   Component Value Date    HDL 57 05/16/2025    HDL 54.3 05/08/2024    HDL 59 01/18/2023     Lab Results   Component Value Date    LDLCALC 170 (H) 05/16/2025    LDLCALC 142 (H) 05/08/2024    LDLCALC 112 01/18/2023     Lab Results   Component Value  "Date    TRIG 102 05/16/2025    TRIG 117 05/08/2024    TRIG 59 01/18/2023     No components found for: \"CHOLHDL\"  Lab Results   Component Value Date    HGBA1C 5.3 05/16/2025     Other labs not included in the list above were reviewed either before or during this encounter.    History   Medical History[1]  Surgical History[2]  Family History[3]  Social History     Socioeconomic History    Marital status:      Spouse name: Not on file    Number of children: Not on file    Years of education: Not on file    Highest education level: Not on file   Occupational History    Not on file   Tobacco Use    Smoking status: Every Day     Current packs/day: 0.75     Average packs/day: 0.8 packs/day for 33.0 years (24.8 ttl pk-yrs)     Types: Cigarettes     Passive exposure: Current    Smokeless tobacco: Never   Vaping Use    Vaping status: Never Used   Substance and Sexual Activity    Alcohol use: Not Currently    Drug use: Never    Sexual activity: Defer     Partners: Male     Birth control/protection: None   Other Topics Concern    Not on file   Social History Narrative    Not on file     Social Drivers of Health     Financial Resource Strain: Not on file   Food Insecurity: Not on file   Transportation Needs: Not on file   Physical Activity: Not on file   Stress: Not on file   Social Connections: Not on file   Intimate Partner Violence: Not on file   Housing Stability: Not on file     Allergies[4]  Medications Ordered Prior to Encounter[5]  Immunization History   Administered Date(s) Administered    COVID-19, mRNA, LNP-S, PF, 30 mcg/0.3 mL dose 11/19/2021    Moderna SARS-CoV-2 Vaccination 03/24/2021, 04/23/2021    Pfizer COVID-19 vaccine, 12 years and older, (30mcg/0.3mL) (Comirnaty) 10/04/2024    Pfizer COVID-19 vaccine, bivalent, age 12 years and older (30 mcg/0.3 mL) 09/27/2022    Pfizer Gray Cap SARS-CoV-2 05/06/2022    Tdap vaccine, age 7 year and older (BOOSTRIX, ADACEL) 01/12/2020, 03/23/2020     Patient's medical " history was reviewed and updated either before or during this encounter.       Omar Guerin MD       [1]   Past Medical History:  Diagnosis Date    Osteoporosis     Psoriasis     Psoriatic arthritis (Multi)     RA (rheumatoid arthritis)     Vitamin D deficiency 02/15/2024   [2]   Past Surgical History:  Procedure Laterality Date    BACK SURGERY      x3    CARPAL TUNNEL RELEASE Right     COLONOSCOPY W/ POLYPECTOMY  04/06/2017    3 polyps removed, largest polyp removed 10mm    COLONOSCOPY W/ POLYPECTOMY  07/07/2020    with Dr. Boudreaux    HYSTERECTOMY     [3]   Family History  Problem Relation Name Age of Onset    Diabetes Mother      Heart disease Mother      Hypertension Mother      Bone cancer Father      Other (multiple health issues) Sister      Prostate cancer Brother     [4]   Allergies  Allergen Reactions    Ciprofloxacin Hives and Unknown    Erythromycin Nausea/vomiting and Unknown    Macrolide Antibiotics GI Upset and Nausea/vomiting     Other reaction(s): Vomiting    Penicillins Hives and Unknown    Sulfa (Sulfonamide Antibiotics) GI Upset, Other and Nausea And Vomiting   [5]   Current Outpatient Medications on File Prior to Visit   Medication Sig Dispense Refill    BIOTIN ORAL Take by mouth once daily.      ibandronate (Boniva) 150 mg tablet TAKE 1 TABLET (150 MG) BY MOUTH EVERY 30 (THIRTY) DAYS. TAKE IN MORNING WITH FULL GLASS OF WATER ON AN EMPTY STOMACH. NO FOOD, DRINK, MEDS, OR LYING DOWN FOR 60 MINUTES AFTER. 3 tablet 0    leflunomide (Arava) 20 mg tablet TAKE 1 TABLET BY MOUTH EVERY DAY 90 tablet 0    nabumetone (Relafen) 750 mg tablet TAKE 1 TABLET BY MOUTH 2 TIMES A DAY AS NEEDED FOR MILD PAIN (1 - 3) OR MODERATE PAIN (4 - 6). 60 tablet 2    Taltz Autoinjector 80 mg/mL injection Inject under the skin every 28 (twenty-eight) days.      [DISCONTINUED] doxycycline (Periostat) 20 mg tablet Take 1 tablet (20 mg) by mouth every 12 hours.      [DISCONTINUED] ibandronate (Boniva) 150 mg tablet Take 1  tablet (150 mg) by mouth every 30 (thirty) days. Take in morning with full glass of water on an empty stomach. No food, drink, meds, or lying down for 60 minutes after. (Patient not taking: Reported on 4/8/2025) 3 tablet 0     No current facility-administered medications on file prior to visit.

## 2025-05-21 ENCOUNTER — OFFICE VISIT (OUTPATIENT)
Dept: RHEUMATOLOGY | Facility: CLINIC | Age: 65
End: 2025-05-21
Payer: COMMERCIAL

## 2025-05-21 VITALS — DIASTOLIC BLOOD PRESSURE: 66 MMHG | SYSTOLIC BLOOD PRESSURE: 126 MMHG | WEIGHT: 119.5 LBS | BODY MASS INDEX: 21.86 KG/M2

## 2025-05-21 DIAGNOSIS — L40.50 PSA (PSORIATIC ARTHRITIS) (MULTI): Primary | ICD-10-CM

## 2025-05-21 DIAGNOSIS — L40.9 PSORIASIS: ICD-10-CM

## 2025-05-21 DIAGNOSIS — M25.562 LEFT KNEE PAIN, UNSPECIFIED CHRONICITY: ICD-10-CM

## 2025-05-21 PROCEDURE — 99214 OFFICE O/P EST MOD 30 MIN: CPT | Performed by: INTERNAL MEDICINE

## 2025-05-21 PROCEDURE — 20610 DRAIN/INJ JOINT/BURSA W/O US: CPT | Mod: 50 | Performed by: INTERNAL MEDICINE

## 2025-05-21 PROCEDURE — 99214 OFFICE O/P EST MOD 30 MIN: CPT | Mod: 25 | Performed by: INTERNAL MEDICINE

## 2025-05-21 PROCEDURE — 2500000004 HC RX 250 GENERAL PHARMACY W/ HCPCS (ALT 636 FOR OP/ED): Performed by: INTERNAL MEDICINE

## 2025-05-21 RX ORDER — TRIAMCINOLONE ACETONIDE 40 MG/ML
40 INJECTION, SUSPENSION INTRA-ARTICULAR; INTRAMUSCULAR
Status: COMPLETED | OUTPATIENT
Start: 2025-05-21 | End: 2025-05-21

## 2025-05-21 RX ADMIN — TRIAMCINOLONE ACETONIDE 40 MG: 40 INJECTION, SUSPENSION INTRA-ARTICULAR; INTRAMUSCULAR at 16:43

## 2025-05-21 NOTE — PROGRESS NOTES
Recheck  PsA  Doing well     HPI - She is able to get taltz for $25.  She feels achy with rainy weather but otherwise not bad.  Her L knee hurts.   No swelling.  AM stiffness 1 hr.   Ps good.  No CP, resp, or GI.    PE  NAD  RRR no r/m/g  CTA  No edema  No synovitis  L knee tender, +/- pain with ROM    A/P - Ps and PsA - knee pain but otherwise gen does well  L knee injection - expl risks/benefits.  Pt gave written consent.  Aseptic tech, injected with 40mg kenalog and 1ml 1% lido  Primary treats OP  Reviewed recent CMP and CBC  Follow up 3 mo  Patient ID: Ammy Mar is a 65 y.o. female.    Large Joint Injection/Arthrocentesis: bilateral knee on 5/21/2025 4:43 PM  Indications: pain  Medications (Right): 40 mg triamcinolone acetonide 40 mg/mL  Medications (Left): 40 mg triamcinolone acetonide 40 mg/mL  Procedure, treatment alternatives, risks and benefits explained, specific risks discussed. Consent was given by the patient.

## 2025-05-24 ENCOUNTER — HOSPITAL ENCOUNTER (OUTPATIENT)
Dept: RADIOLOGY | Facility: HOSPITAL | Age: 65
Discharge: HOME | End: 2025-05-24
Payer: COMMERCIAL

## 2025-05-24 VITALS — BODY MASS INDEX: 22.08 KG/M2 | HEIGHT: 62 IN | WEIGHT: 120 LBS

## 2025-05-24 DIAGNOSIS — Z12.31 ENCOUNTER FOR SCREENING MAMMOGRAM FOR MALIGNANT NEOPLASM OF BREAST: ICD-10-CM

## 2025-05-24 PROCEDURE — 77067 SCR MAMMO BI INCL CAD: CPT | Performed by: STUDENT IN AN ORGANIZED HEALTH CARE EDUCATION/TRAINING PROGRAM

## 2025-05-24 PROCEDURE — 77063 BREAST TOMOSYNTHESIS BI: CPT | Performed by: STUDENT IN AN ORGANIZED HEALTH CARE EDUCATION/TRAINING PROGRAM

## 2025-05-24 PROCEDURE — 77063 BREAST TOMOSYNTHESIS BI: CPT

## 2025-06-03 ENCOUNTER — TELEPHONE (OUTPATIENT)
Dept: RHEUMATOLOGY | Facility: CLINIC | Age: 65
End: 2025-06-03
Payer: COMMERCIAL

## 2025-06-03 NOTE — TELEPHONE ENCOUNTER
Patient states she was charged a facility fee of 1,611.00 for her last visit with you.  She spoke with her insurance and our  billing office and they stated that until she meets her decuctible, she is responsible for that charge.  Patient states that her facility fee is more than 3 times the charge to see you and she simply cannot afford to pay that every three months to see you.    Per the patient, you are only prescribing her nabumetone.  Dermatology prescribes her Tatlz.  Requesting less frequent appointments.

## 2025-06-12 DIAGNOSIS — M06.9 RHEUMATOID ARTHRITIS, INVOLVING UNSPECIFIED SITE, UNSPECIFIED WHETHER RHEUMATOID FACTOR PRESENT (MULTI): ICD-10-CM

## 2025-06-12 RX ORDER — LEFLUNOMIDE 20 MG/1
20 TABLET ORAL DAILY
Qty: 90 TABLET | Refills: 0 | Status: SHIPPED | OUTPATIENT
Start: 2025-06-12

## 2025-07-26 DIAGNOSIS — M06.9 RHEUMATOID ARTHRITIS, INVOLVING UNSPECIFIED SITE, UNSPECIFIED WHETHER RHEUMATOID FACTOR PRESENT (MULTI): ICD-10-CM

## 2025-07-28 RX ORDER — NABUMENTONE 750 MG/1
TABLET ORAL
Qty: 180 TABLET | Refills: 2 | Status: SHIPPED | OUTPATIENT
Start: 2025-07-28

## 2025-08-08 LAB
ALT SERPL-CCNC: 18 U/L (ref 6–29)
AST SERPL-CCNC: 22 U/L (ref 10–35)
CHOLEST SERPL-MCNC: 176 MG/DL
CHOLEST/HDLC SERPL: 2.7 (CALC)
HDLC SERPL-MCNC: 65 MG/DL
LDLC SERPL CALC-MCNC: 91 MG/DL (CALC)
NONHDLC SERPL-MCNC: 111 MG/DL (CALC)
TRIGL SERPL-MCNC: 108 MG/DL

## 2025-08-11 DIAGNOSIS — M81.0 AGE-RELATED OSTEOPOROSIS WITHOUT CURRENT PATHOLOGICAL FRACTURE: ICD-10-CM

## 2025-08-11 DIAGNOSIS — E78.00 PURE HYPERCHOLESTEROLEMIA: ICD-10-CM

## 2025-08-12 RX ORDER — ROSUVASTATIN CALCIUM 5 MG/1
5 TABLET, COATED ORAL DAILY
Qty: 90 TABLET | Refills: 0 | Status: SHIPPED | OUTPATIENT
Start: 2025-08-12

## 2025-08-25 ENCOUNTER — APPOINTMENT (OUTPATIENT)
Dept: PRIMARY CARE | Facility: CLINIC | Age: 65
End: 2025-08-25
Payer: COMMERCIAL

## 2025-08-27 ENCOUNTER — OFFICE VISIT (OUTPATIENT)
Dept: PRIMARY CARE | Facility: CLINIC | Age: 65
End: 2025-08-27
Payer: COMMERCIAL

## 2025-08-27 VITALS
SYSTOLIC BLOOD PRESSURE: 124 MMHG | DIASTOLIC BLOOD PRESSURE: 80 MMHG | HEIGHT: 62 IN | HEART RATE: 79 BPM | OXYGEN SATURATION: 97 % | BODY MASS INDEX: 22.45 KG/M2 | TEMPERATURE: 96.6 F | WEIGHT: 122 LBS

## 2025-08-27 DIAGNOSIS — E78.00 PURE HYPERCHOLESTEROLEMIA: Primary | ICD-10-CM

## 2025-08-27 DIAGNOSIS — Z72.0 TOBACCO USE: ICD-10-CM

## 2025-08-27 PROCEDURE — 3008F BODY MASS INDEX DOCD: CPT

## 2025-08-27 PROCEDURE — 99214 OFFICE O/P EST MOD 30 MIN: CPT

## 2025-08-27 PROCEDURE — 4004F PT TOBACCO SCREEN RCVD TLK: CPT

## 2025-08-27 PROCEDURE — 1126F AMNT PAIN NOTED NONE PRSNT: CPT

## 2025-08-27 PROCEDURE — 1159F MED LIST DOCD IN RCRD: CPT

## 2025-08-27 RX ORDER — ROSUVASTATIN CALCIUM 5 MG/1
5 TABLET, COATED ORAL DAILY
Qty: 90 TABLET | Refills: 3 | Status: SHIPPED | OUTPATIENT
Start: 2025-08-27

## 2025-08-27 ASSESSMENT — ENCOUNTER SYMPTOMS
RESPIRATORY NEGATIVE: 1
CARDIOVASCULAR NEGATIVE: 1
MYALGIAS: 0
CONSTITUTIONAL NEGATIVE: 1
GASTROINTESTINAL NEGATIVE: 1

## 2025-08-27 ASSESSMENT — PATIENT HEALTH QUESTIONNAIRE - PHQ9
2. FEELING DOWN, DEPRESSED OR HOPELESS: NOT AT ALL
SUM OF ALL RESPONSES TO PHQ9 QUESTIONS 1 AND 2: 0
1. LITTLE INTEREST OR PLEASURE IN DOING THINGS: NOT AT ALL

## 2025-08-27 ASSESSMENT — PAIN SCALES - GENERAL: PAINLEVEL_OUTOF10: 0-NO PAIN

## 2025-09-05 ENCOUNTER — APPOINTMENT (OUTPATIENT)
Dept: RHEUMATOLOGY | Facility: CLINIC | Age: 65
End: 2025-09-05
Payer: COMMERCIAL

## 2026-05-22 ENCOUNTER — APPOINTMENT (OUTPATIENT)
Dept: RHEUMATOLOGY | Facility: CLINIC | Age: 66
End: 2026-05-22
Payer: COMMERCIAL

## 2026-05-28 ENCOUNTER — APPOINTMENT (OUTPATIENT)
Dept: PRIMARY CARE | Facility: CLINIC | Age: 66
End: 2026-05-28
Payer: COMMERCIAL